# Patient Record
Sex: MALE | Race: ASIAN | NOT HISPANIC OR LATINO | URBAN - METROPOLITAN AREA
[De-identification: names, ages, dates, MRNs, and addresses within clinical notes are randomized per-mention and may not be internally consistent; named-entity substitution may affect disease eponyms.]

---

## 2023-04-22 ENCOUNTER — INPATIENT (INPATIENT)
Facility: HOSPITAL | Age: 78
LOS: 4 days | Discharge: NURSING, REHAB OR LONG-TERM CARE | DRG: 86 | End: 2023-04-27
Attending: INTERNAL MEDICINE | Admitting: INTERNAL MEDICINE
Payer: MEDICARE

## 2023-04-22 VITALS
RESPIRATION RATE: 16 BRPM | DIASTOLIC BLOOD PRESSURE: 73 MMHG | SYSTOLIC BLOOD PRESSURE: 104 MMHG | HEART RATE: 117 BPM | HEIGHT: 64 IN | TEMPERATURE: 97 F | OXYGEN SATURATION: 98 % | WEIGHT: 139.99 LBS

## 2023-04-22 DIAGNOSIS — I62.00 NONTRAUMATIC SUBDURAL HEMORRHAGE, UNSPECIFIED: ICD-10-CM

## 2023-04-22 LAB
ALBUMIN SERPL ELPH-MCNC: 4 G/DL — SIGNIFICANT CHANGE UP (ref 3.3–5)
ALP SERPL-CCNC: 56 U/L — SIGNIFICANT CHANGE UP (ref 40–120)
ALT FLD-CCNC: 19 U/L — SIGNIFICANT CHANGE UP (ref 10–45)
ANION GAP SERPL CALC-SCNC: 12 MMOL/L — SIGNIFICANT CHANGE UP (ref 5–17)
APTT BLD: 28.5 SEC — SIGNIFICANT CHANGE UP (ref 27.5–35.5)
AST SERPL-CCNC: 36 U/L — SIGNIFICANT CHANGE UP (ref 10–40)
BASOPHILS # BLD AUTO: 0.01 K/UL — SIGNIFICANT CHANGE UP (ref 0–0.2)
BASOPHILS NFR BLD AUTO: 0.2 % — SIGNIFICANT CHANGE UP (ref 0–2)
BILIRUB SERPL-MCNC: 0.7 MG/DL — SIGNIFICANT CHANGE UP (ref 0.2–1.2)
BUN SERPL-MCNC: 17 MG/DL — SIGNIFICANT CHANGE UP (ref 7–23)
CALCIUM SERPL-MCNC: 9.7 MG/DL — SIGNIFICANT CHANGE UP (ref 8.4–10.5)
CHLORIDE SERPL-SCNC: 98 MMOL/L — SIGNIFICANT CHANGE UP (ref 96–108)
CO2 SERPL-SCNC: 26 MMOL/L — SIGNIFICANT CHANGE UP (ref 22–31)
CREAT SERPL-MCNC: 0.93 MG/DL — SIGNIFICANT CHANGE UP (ref 0.5–1.3)
EGFR: 84 ML/MIN/1.73M2 — SIGNIFICANT CHANGE UP
EOSINOPHIL # BLD AUTO: 0.08 K/UL — SIGNIFICANT CHANGE UP (ref 0–0.5)
EOSINOPHIL NFR BLD AUTO: 1.4 % — SIGNIFICANT CHANGE UP (ref 0–6)
GLUCOSE SERPL-MCNC: 161 MG/DL — HIGH (ref 70–99)
HCT VFR BLD CALC: 42.3 % — SIGNIFICANT CHANGE UP (ref 39–50)
HGB BLD-MCNC: 14.1 G/DL — SIGNIFICANT CHANGE UP (ref 13–17)
IMM GRANULOCYTES NFR BLD AUTO: 0.7 % — SIGNIFICANT CHANGE UP (ref 0–0.9)
INR BLD: 1.04 RATIO — SIGNIFICANT CHANGE UP (ref 0.88–1.16)
LYMPHOCYTES # BLD AUTO: 1.07 K/UL — SIGNIFICANT CHANGE UP (ref 1–3.3)
LYMPHOCYTES # BLD AUTO: 18.3 % — SIGNIFICANT CHANGE UP (ref 13–44)
MAGNESIUM SERPL-MCNC: 2 MG/DL — SIGNIFICANT CHANGE UP (ref 1.6–2.6)
MCHC RBC-ENTMCNC: 29.9 PG — SIGNIFICANT CHANGE UP (ref 27–34)
MCHC RBC-ENTMCNC: 33.3 GM/DL — SIGNIFICANT CHANGE UP (ref 32–36)
MCV RBC AUTO: 89.6 FL — SIGNIFICANT CHANGE UP (ref 80–100)
MONOCYTES # BLD AUTO: 0.6 K/UL — SIGNIFICANT CHANGE UP (ref 0–0.9)
MONOCYTES NFR BLD AUTO: 10.3 % — SIGNIFICANT CHANGE UP (ref 2–14)
NEUTROPHILS # BLD AUTO: 4.04 K/UL — SIGNIFICANT CHANGE UP (ref 1.8–7.4)
NEUTROPHILS NFR BLD AUTO: 69.1 % — SIGNIFICANT CHANGE UP (ref 43–77)
NRBC # BLD: 0 /100 WBCS — SIGNIFICANT CHANGE UP (ref 0–0)
PHOSPHATE SERPL-MCNC: 3.2 MG/DL — SIGNIFICANT CHANGE UP (ref 2.5–4.5)
PLATELET # BLD AUTO: 218 K/UL — SIGNIFICANT CHANGE UP (ref 150–400)
POTASSIUM SERPL-MCNC: 4.5 MMOL/L — SIGNIFICANT CHANGE UP (ref 3.5–5.3)
POTASSIUM SERPL-SCNC: 4.5 MMOL/L — SIGNIFICANT CHANGE UP (ref 3.5–5.3)
PROT SERPL-MCNC: 7.3 G/DL — SIGNIFICANT CHANGE UP (ref 6–8.3)
PROTHROM AB SERPL-ACNC: 12 SEC — SIGNIFICANT CHANGE UP (ref 10.5–13.4)
RBC # BLD: 4.72 M/UL — SIGNIFICANT CHANGE UP (ref 4.2–5.8)
RBC # FLD: 12.7 % — SIGNIFICANT CHANGE UP (ref 10.3–14.5)
SODIUM SERPL-SCNC: 136 MMOL/L — SIGNIFICANT CHANGE UP (ref 135–145)
WBC # BLD: 5.84 K/UL — SIGNIFICANT CHANGE UP (ref 3.8–10.5)
WBC # FLD AUTO: 5.84 K/UL — SIGNIFICANT CHANGE UP (ref 3.8–10.5)

## 2023-04-22 PROCEDURE — 99291 CRITICAL CARE FIRST HOUR: CPT

## 2023-04-22 PROCEDURE — 74177 CT ABD & PELVIS W/CONTRAST: CPT | Mod: 26,MD

## 2023-04-22 PROCEDURE — 70450 CT HEAD/BRAIN W/O DYE: CPT | Mod: 26

## 2023-04-22 PROCEDURE — 70450 CT HEAD/BRAIN W/O DYE: CPT | Mod: 26,MD,77

## 2023-04-22 PROCEDURE — 71260 CT THORAX DX C+: CPT | Mod: 26,MD

## 2023-04-22 RX ORDER — ACETAMINOPHEN 500 MG
1000 TABLET ORAL ONCE
Refills: 0 | Status: COMPLETED | OUTPATIENT
Start: 2023-04-22 | End: 2023-04-23

## 2023-04-22 RX ORDER — LEVETIRACETAM 250 MG/1
1000 TABLET, FILM COATED ORAL ONCE
Refills: 0 | Status: DISCONTINUED | OUTPATIENT
Start: 2023-04-22 | End: 2023-04-22

## 2023-04-22 RX ORDER — LEVETIRACETAM 250 MG/1
1000 TABLET, FILM COATED ORAL ONCE
Refills: 0 | Status: COMPLETED | OUTPATIENT
Start: 2023-04-22 | End: 2023-04-22

## 2023-04-22 RX ORDER — ACETAMINOPHEN 500 MG
975 TABLET ORAL ONCE
Refills: 0 | Status: DISCONTINUED | OUTPATIENT
Start: 2023-04-22 | End: 2023-04-22

## 2023-04-22 RX ORDER — SIMVASTATIN 20 MG/1
1 TABLET, FILM COATED ORAL
Refills: 0 | DISCHARGE

## 2023-04-22 RX ORDER — TRAZODONE HCL 50 MG
1 TABLET ORAL
Refills: 0 | DISCHARGE

## 2023-04-22 RX ORDER — TAMSULOSIN HYDROCHLORIDE 0.4 MG/1
1 CAPSULE ORAL
Refills: 0 | DISCHARGE

## 2023-04-22 RX ORDER — METFORMIN HYDROCHLORIDE 850 MG/1
1 TABLET ORAL
Refills: 0 | DISCHARGE

## 2023-04-22 RX ORDER — ASPIRIN/CALCIUM CARB/MAGNESIUM 324 MG
1 TABLET ORAL
Refills: 0 | DISCHARGE

## 2023-04-22 RX ADMIN — LEVETIRACETAM 400 MILLIGRAM(S): 250 TABLET, FILM COATED ORAL at 16:45

## 2023-04-22 NOTE — ED ADULT NURSE NOTE - NSIMPLEMENTINTERV_GEN_ALL_ED
Implemented All Fall Risk Interventions:  [place] to call system. Call bell, personal items and telephone within reach. Instruct patient to call for assistance. Room bathroom lighting operational. Non-slip footwear when patient is off stretcher. Physically safe environment: no spills, clutter or unnecessary equipment. Stretcher in lowest position, wheels locked, appropriate side rails in place. Provide visual cue, wrist band, yellow gown, etc. Monitor gait and stability. Monitor for mental status changes and reorient to person, place, and time. Review medications for side effects contributing to fall risk. Reinforce activity limits and safety measures with patient and family.

## 2023-04-22 NOTE — ED ADULT NURSE REASSESSMENT NOTE - NS ED NURSE REASSESS COMMENT FT1
Son at bedside to assist in translation. Patient A&Ox2, disoriented to time. Patient moves all extremities with equal strength. PERRLA 3mm b/l.

## 2023-04-22 NOTE — ED ADULT NURSE NOTE - OBJECTIVE STATEMENT
77 year old male patient presents to ED with multiple medical complaints. Patient primarily Polish speaking with son Elkin at bedside to assist in translation. Per son, patient has been having difficulty ambulating x 1 year, worsening recently. Son states that patient walks off balance and leans back when walking and has had multiple falls. Patient last fell this morning and two days ago and hit R side of head- yellowish bruising noted. Denies LOC at time Patient usually able to ambulate with a cane but now needs to use a walker. Patient denies pain or discomfort. Denies HA, dizziness, CP, palpitations, abd pain, n/v/d, fever, chills, urinary symptoms. Patient has been following with a neurologist and had MRI of brain in February and MRI of spine this week with no pertinent findings. Son reports decreased PO intake and unintentional weight loss. Patient and family aware of plan of care

## 2023-04-22 NOTE — CONSULT NOTE ADULT - ASSESSMENT
Jillian Aviles  77M Hx HTN/HLD/CAD w/stent 2yr prior on ASA p/f generalized weakness and multiple recent falls, most recent this morning. Last fall w/headstrike 2d ago. Pt has lost 10lb last two weeks, recent outpatient MR neuroaxis w/C3/4, C4/5 disks with moderate canal stenosis, complains of neck pain w/radic. CTH w/small R subacute SDH w/o MLS or sig mass effect. Plt wnl  Exam: Romansh speaking, AOx2, PERRL, EOMI, no facial, no drift, WORKMAN 5/5, not myelopathic  -Coags  -keppra 500BID for 7d  -4h repeat CTh, q1h neurochecks until repeat, if stable ok for q4h  -Additional long interval CTH tomorrow AM given ASA  -no acute neurosurgery intervention, should follow up in clinic with Dr. Puga in 2 weeks to f/u the SDH and discuss his cervical stenosis  -Hold AC/AP, can start SQL for DVT chemoppx if otherwise indicated 24h after tomorrow AM's CTH if read as stable Jillian Aviles  77M Hx HTN/HLD/CAD w/stent 2yr prior on ASA p/f generalized weakness and multiple recent falls, most recent this morning. Last fall w/headstrike 2d ago. Pt has lost 10lb last two weeks, recent outpatient MR neuroaxis w/C3/4, C4/5 disks with moderate canal stenosis, complains of neck pain w/radic. CTH w/small L subacute SDH w/o MLS or sig mass effect. Plt wnl  Exam: Setswana speaking, AOx2, PERRL, EOMI, no facial, no drift, WORKMAN 5/5, not myelopathic  -Coags  -keppra 500BID for 7d  -4h repeat CTh, q1h neurochecks until repeat, if stable ok for q4h  -Additional long interval CTH tomorrow AM given ASA  -no acute neurosurgery intervention, should follow up in clinic with Dr. Puga in 2 weeks to f/u the SDH and discuss his cervical stenosis  -Hold AC/AP, can start SQL for DVT chemoppx if otherwise indicated 24h after tomorrow AM's CTH if read as stable

## 2023-04-22 NOTE — ED PROVIDER NOTE - PHYSICAL EXAMINATION
Gen: cachectic   HEENT: EOMI, no nasal discharge, mucous membranes dry, no scalp hematoma. + mild R periorbital ecchymosis.   CV: fast rate, regular rhythm, 2+ radial pulses b/l  Resp: no accessory muscle use, no increased work of breathing  GI: Abdomen soft non-distended, NTTP  MSK: No open wounds, no LE edema, no midline spinal ttp. no clavicular stepoffs or deformities.   Neuro: A&Ox4, following commands, moving all four extremities spontaneously. CN3-12 intact, EOMI. PERRLA, 5/5 strength in b/l UE, 4 b/l LE.  Sensation intact bl in UE/LE.  Psych: appropriate mood

## 2023-04-22 NOTE — ED ADULT NURSE REASSESSMENT NOTE - NS ED NURSE REASSESS COMMENT FT1
Patient returned from CT with son at bedside. Patient remains A&Ox2, disoriented to year. PERRRL b/l, 3mm. Equal strength noted x 4 extremities. Son aware of plan of care to await CT results.

## 2023-04-22 NOTE — CONSULT NOTE ADULT - SUBJECTIVE AND OBJECTIVE BOX
p (1480)     HPI:  77M Hx HTN/HLD/CAD w/stent 2yr prior on ASA p/f generalized weakness and multiple recent falls, most recent this morning. Last fall w/headstrike 2d ago. Pt has lost 10lb last two weeks, recent outpatient MR neuroaxis w/C3/4, C4/5 disks with moderate canal stenosis, complains of neck pain w/radic. CTH w/small R subacute SDH w/o MLS or sig mass effect. Plt wnl  Exam: Georgian speaking, AOx2, PERRL, EOMI, no facial, no drift, WORKMAN 5/5, not myelopathic        --Anticoagulation: ASA81    =====================  PAST MEDICAL HISTORY     PAST SURGICAL HISTORY     No Known Allergies      MEDICATIONS:  Antibiotics:    Neuro:  acetaminophen     Tablet .. 975 milliGRAM(s) Oral once    Other:      SOCIAL HISTORY:   Occupation:   Marital Status:     FAMILY HISTORY:      ROS: Negative except per HPI    LABS:                          14.1   5.84  )-----------( 218      ( 22 Apr 2023 11:22 )             42.3     04-22    136  |  98  |  17  ----------------------------<  161<H>  4.5   |  26  |  0.93    Ca    9.7      22 Apr 2023 11:22  Phos  3.2     04-22  Mg     2.0     04-22    TPro  7.3  /  Alb  4.0  /  TBili  0.7  /  DBili  x   /  AST  36  /  ALT  19  /  AlkPhos  56  04-22       p (1480)     HPI:  77M Hx HTN/HLD/CAD w/stent 2yr prior on ASA p/f generalized weakness and multiple recent falls, most recent this morning. Last fall w/headstrike 2d ago. Pt has lost 10lb last two weeks, recent outpatient MR neuroaxis w/C3/4, C4/5 disks with moderate canal stenosis, complains of neck pain w/radic. CTH w/small L subacute SDH w/o MLS or sig mass effect. Plt wnl  Exam: Maltese speaking, AOx2, PERRL, EOMI, no facial, no drift, WORKMAN 5/5, not myelopathic        --Anticoagulation: ASA81    =====================  PAST MEDICAL HISTORY     PAST SURGICAL HISTORY     No Known Allergies      MEDICATIONS:  Antibiotics:    Neuro:  acetaminophen     Tablet .. 975 milliGRAM(s) Oral once    Other:      SOCIAL HISTORY:   Occupation:   Marital Status:     FAMILY HISTORY:      ROS: Negative except per HPI    LABS:                          14.1   5.84  )-----------( 218      ( 22 Apr 2023 11:22 )             42.3     04-22    136  |  98  |  17  ----------------------------<  161<H>  4.5   |  26  |  0.93    Ca    9.7      22 Apr 2023 11:22  Phos  3.2     04-22  Mg     2.0     04-22    TPro  7.3  /  Alb  4.0  /  TBili  0.7  /  DBili  x   /  AST  36  /  ALT  19  /  AlkPhos  56  04-22

## 2023-04-22 NOTE — PATIENT PROFILE ADULT - FUNCTIONAL ASSESSMENT - BASIC MOBILITY 6.
2-calculated by average/Not able to assess (calculate score using [facility] averaging method)

## 2023-04-22 NOTE — ED PROVIDER NOTE - OBJECTIVE STATEMENT
78YO M hx HTN, HLD, p/w leg weakness, weight loss. Onset months prior, associated with multiple falls, most recent this morning, fell onto the side.  Also endorses fall with head strike 2 days prior, patient at baseline mental status.  Patient has had significant work-up for this, including MRI brain, MRI C/T/L-spine, significant for moderate neuroforaminal stenosis of the C-spine, no major issues to the L-spine.  Patient also had outpatient echo, stress test, all normal.  Per son, has had 10 pounds of weight loss over the past 2 weeks, appears very thin, decreased p.o. intake.  Denies any recent blurry vision, numbness/paresthesias to arms/legs, headache, neck pain/stiffness.  denies recent fevers, cough, shortness of breath, abdominal pain, back pain.  Patient not on blood thinner.

## 2023-04-22 NOTE — ED ADULT NURSE REASSESSMENT NOTE - NSFALLRSKASSESSDT_ED_ALL_ED
22-Apr-2023 17:57
22-Apr-2023 19:01
22-Apr-2023 11:33
22-Apr-2023 14:16
22-Apr-2023 18:27
22-Apr-2023 16:30
22-Apr-2023 16:50

## 2023-04-22 NOTE — ED ADULT TRIAGE NOTE - CHIEF COMPLAINT QUOTE
not able to ambulate; last few months getting worse; now falling all the time; mri showed nothing; speaking slowly; not sleeping; not eating

## 2023-04-22 NOTE — H&P ADULT - NSHPPHYSICALEXAM_GEN_ALL_CORE
Vital Signs Last 24 Hrs  T(C): 36.7 (04-22-23 @ 21:00), Max: 37 (04-22-23 @ 16:40)  T(F): 98.1 (04-22-23 @ 21:00), Max: 98.6 (04-22-23 @ 16:40)  HR: 102 (04-22-23 @ 21:00) (102 - 117)  BP: 144/78 (04-22-23 @ 21:00) (104/73 - 144/78)  BP(mean): 93 (04-22-23 @ 15:44) (93 - 106)  RR: 17 (04-22-23 @ 21:00) (16 - 18)  SpO2: 98% (04-22-23 @ 21:00) (98% - 100%)
No change

## 2023-04-22 NOTE — ED ADULT NURSE REASSESSMENT NOTE - NS ED NURSE REASSESS COMMENT FT1
Patient failed dysphagia screening. MD Zaragoza made aware- to change keppra order to IV. Q1hour neuro checks initiated, patient disoriented to time, moving all extremities with equal strength. Family at bedside aware of NPO status and plan for repeat CT at 5430pm. Syriac  # [id number], Raymond used for translation. Patient failed dysphagia screening. MD Zaragoza made aware- to change keppra order to IV. Q1hour neuro checks initiated, patient disoriented to time, moving all extremities with equal strength. Family at bedside aware of NPO status and plan for repeat CT at 5430pm.

## 2023-04-22 NOTE — H&P ADULT - NSHPLABSRESULTS_GEN_ALL_CORE
14.1   5.84  )-----------( 218      ( 22 Apr 2023 11:22 )             42.3   04-22    136  |  98  |  17  ----------------------------<  161<H>  4.5   |  26  |  0.93    Ca    9.7      22 Apr 2023 11:22  Phos  3.2     04-22  Mg     2.0     04-22    TPro  7.3  /  Alb  4.0  /  TBili  0.7  /  DBili  x   /  AST  36  /  ALT  19  /  AlkPhos  56  04-22

## 2023-04-22 NOTE — ED PROVIDER NOTE - CLINICAL SUMMARY MEDICAL DECISION MAKING FREE TEXT BOX
Sarah Zaragoza MD, PGY-3: 76YO M hx HTN, HLD, p/w leg weakness, weight loss. vs: tachycardic. consider new malignancy, lower suspicion ICH although possible given recent trauma.   Also consider electrolyte issue. plan for basic labs, CT head. Sarah Zaragoza MD, PGY-3: 78YO M hx HTN, HLD, p/w leg weakness, weight loss. vs: tachycardic. consider new malignancy, lower suspicion ICH although possible given recent trauma.   Also consider electrolyte issue. plan for basic labs, CT head.    Adam: 77 year old male with pmhx of htn, hld, here with leg weakness, weight loss. patient reports weight loss over months.  had mri outpatient of c/t/l-spine that shows moderate neuroforaminal stenosis.  had outpatient echo/stress all normal. no focal deficits on exams. will get labs, ct head, reassess

## 2023-04-22 NOTE — ED PROVIDER NOTE - PROGRESS NOTE DETAILS
Saarh Zaragoza MD, PGY-3: neurosurgery aware. will gabby pt. clinically stable, well appearing. Sarah Zaragoza PGY3 signout given to krystyna Gan for PT assessment, frequent falls, repeat head ct.

## 2023-04-22 NOTE — H&P ADULT - ASSESSMENT
A/P     #Subdural hematoma s/p fall   # syncope   small SDH on CT scan   seen by neurosurgery : no ac intervention   -pt. has recurrent fall   -consult neurology Dr. Jones     Weight loss / cachexia  -as per son lost 10 LBS in last 2 weeks   poor appetite   CT ches/abd/pelvis : done : wnl in ED     Atelectasis :  -on CT chest   ? PNA : but no wbc , no fever   will hold off on abx     BPH   c/w flomax     HTN :   c/w losartan , hold other meds   if BP elevated , will restart all     HLD :  -hold statin or home meds     dispo: will need PT eval and likely STR

## 2023-04-22 NOTE — ED ADULT NURSE REASSESSMENT NOTE - NS ED NURSE REASSESS COMMENT FT1
Attempted to call report to 3 ana with no answer. Oncoming [place] and charge RN  made aware report not called

## 2023-04-22 NOTE — PATIENT PROFILE ADULT - FALL HARM RISK - HARM RISK INTERVENTIONS

## 2023-04-23 LAB
GLUCOSE BLDC GLUCOMTR-MCNC: 108 MG/DL — HIGH (ref 70–99)
GLUCOSE BLDC GLUCOMTR-MCNC: 116 MG/DL — HIGH (ref 70–99)
GLUCOSE BLDC GLUCOMTR-MCNC: 186 MG/DL — HIGH (ref 70–99)
GLUCOSE BLDC GLUCOMTR-MCNC: 95 MG/DL — SIGNIFICANT CHANGE UP (ref 70–99)
GLUCOSE BLDC GLUCOMTR-MCNC: 97 MG/DL — SIGNIFICANT CHANGE UP (ref 70–99)

## 2023-04-23 PROCEDURE — 70450 CT HEAD/BRAIN W/O DYE: CPT | Mod: 26

## 2023-04-23 RX ORDER — SODIUM CHLORIDE 9 MG/ML
1000 INJECTION, SOLUTION INTRAVENOUS
Refills: 0 | Status: DISCONTINUED | OUTPATIENT
Start: 2023-04-23 | End: 2023-04-27

## 2023-04-23 RX ORDER — LEVETIRACETAM 250 MG/1
500 TABLET, FILM COATED ORAL
Refills: 0 | Status: DISCONTINUED | OUTPATIENT
Start: 2023-04-23 | End: 2023-04-27

## 2023-04-23 RX ORDER — DEXTROSE 50 % IN WATER 50 %
25 SYRINGE (ML) INTRAVENOUS ONCE
Refills: 0 | Status: DISCONTINUED | OUTPATIENT
Start: 2023-04-23 | End: 2023-04-27

## 2023-04-23 RX ORDER — TRAZODONE HCL 50 MG
100 TABLET ORAL ONCE
Refills: 0 | Status: COMPLETED | OUTPATIENT
Start: 2023-04-23 | End: 2023-04-23

## 2023-04-23 RX ORDER — INSULIN LISPRO 100/ML
3 VIAL (ML) SUBCUTANEOUS
Refills: 0 | Status: DISCONTINUED | OUTPATIENT
Start: 2023-04-23 | End: 2023-04-27

## 2023-04-23 RX ORDER — DEXTROSE 50 % IN WATER 50 %
12.5 SYRINGE (ML) INTRAVENOUS ONCE
Refills: 0 | Status: DISCONTINUED | OUTPATIENT
Start: 2023-04-23 | End: 2023-04-27

## 2023-04-23 RX ORDER — DEXTROSE 50 % IN WATER 50 %
15 SYRINGE (ML) INTRAVENOUS ONCE
Refills: 0 | Status: DISCONTINUED | OUTPATIENT
Start: 2023-04-23 | End: 2023-04-27

## 2023-04-23 RX ORDER — PANTOPRAZOLE SODIUM 20 MG/1
40 TABLET, DELAYED RELEASE ORAL
Refills: 0 | Status: DISCONTINUED | OUTPATIENT
Start: 2023-04-23 | End: 2023-04-27

## 2023-04-23 RX ORDER — INSULIN LISPRO 100/ML
VIAL (ML) SUBCUTANEOUS
Refills: 0 | Status: DISCONTINUED | OUTPATIENT
Start: 2023-04-23 | End: 2023-04-25

## 2023-04-23 RX ORDER — GLUCAGON INJECTION, SOLUTION 0.5 MG/.1ML
1 INJECTION, SOLUTION SUBCUTANEOUS ONCE
Refills: 0 | Status: DISCONTINUED | OUTPATIENT
Start: 2023-04-23 | End: 2023-04-27

## 2023-04-23 RX ADMIN — PANTOPRAZOLE SODIUM 40 MILLIGRAM(S): 20 TABLET, DELAYED RELEASE ORAL at 06:13

## 2023-04-23 RX ADMIN — Medication 400 MILLIGRAM(S): at 00:37

## 2023-04-23 RX ADMIN — Medication 1000 MILLIGRAM(S): at 01:30

## 2023-04-23 RX ADMIN — Medication 3 UNIT(S): at 16:58

## 2023-04-23 RX ADMIN — Medication 100 MILLIGRAM(S): at 00:53

## 2023-04-23 RX ADMIN — Medication 3 UNIT(S): at 13:11

## 2023-04-23 RX ADMIN — LEVETIRACETAM 500 MILLIGRAM(S): 250 TABLET, FILM COATED ORAL at 17:01

## 2023-04-23 RX ADMIN — Medication 1: at 13:10

## 2023-04-23 RX ADMIN — LEVETIRACETAM 500 MILLIGRAM(S): 250 TABLET, FILM COATED ORAL at 06:13

## 2023-04-23 NOTE — PROGRESS NOTE ADULT - ASSESSMENT
A/P     #Subdural hematoma s/p fall   # syncope   small SDH on CT scan   seen by neurosurgery : no ac intervention   -pt. has recurrent fall   -consult neurology Dr. Jones : done     Weight loss / cachexia  -as per son lost 10 LBS in last 2 weeks   poor appetite   CT ches/abd/pelvis : done : wnl in ED     Atelectasis :  -on CT chest   ? PNA : but no wbc , no fever   will hold off on abx     BPH   c/w flomax     HTN :   c/w losartan , hold other meds   if BP elevated , will restart all     HLD :  -hold statin or home meds     dispo: start heparin sq from am for DVT prophylaxsis   PT eval

## 2023-04-23 NOTE — CHART NOTE - NSCHARTNOTEFT_GEN_A_CORE
CT head today stable.    -no acute neurosurgery intervention, patient should follow up in clinic with Dr. Puga in 2 weeks to f/u the SDH and discuss his cervical stenosis  -Hold AC/AP, can start SQL for DVT chemoppx if otherwise indicated in 24hrs

## 2023-04-23 NOTE — CONSULT NOTE ADULT - SUBJECTIVE AND OBJECTIVE BOX
[facility]        Patient is a 77y old  Male who presents with a chief complaint of generalized weakness , multiple falls (2023 11:00)    Excerpt from H&P,"     78YO M hx HTN, HLD, p/w leg weakness, weight loss. Onset months prior, associated with multiple falls, most recent this morning, fell onto the side.  Also endorses fall with head strike 2 days prior, patient at baseline mental status.  Patient has had significant work-up for this, including MRI brain, MRI C/T/L-spine, significant for moderate neuroforaminal stenosis of the C-spine, no major issues to the L-spine.  Patient also had outpatient echo, stress test, all normal.  Per son, has had 10 pounds of weight loss over the past 2 weeks, appears very thin, decreased p.o. intake.  Denies any recent blurry vision, numbness/paresthesias to arms/legs, headache, neck pain/stiffness.  denies recent fevers, cough, shortness of breath, abdominal pain, back pain.  Patient not on blood thinner. (2023 14:57)           *****PAST MEDICAL / Surgical  HISTORY:  PAST MEDICAL & SURGICAL HISTORY:  No pertinent past medical history      No significant past surgical history               *****FAMILY HISTORY:  FAMILY HISTORY:           *****SOCIAL HISTORY:  Alcohol: None  Smoking: None         *****ALLERGIES:   Allergies    No Known Allergies    Intolerances             *****MEDICATIONS: current medication reviewed and documented.   MEDICATIONS  (STANDING):  dextrose 5%. 1000 milliLiter(s) (100 mL/Hr) IV Continuous <Continuous>  dextrose 5%. 1000 milliLiter(s) (50 mL/Hr) IV Continuous <Continuous>  dextrose 50% Injectable 12.5 Gram(s) IV Push once  dextrose 50% Injectable 25 Gram(s) IV Push once  dextrose 50% Injectable 25 Gram(s) IV Push once  glucagon  Injectable 1 milliGRAM(s) IntraMuscular once  insulin lispro (ADMELOG) corrective regimen sliding scale   SubCutaneous three times a day before meals  insulin lispro Injectable (ADMELOG) 3 Unit(s) SubCutaneous before breakfast  insulin lispro Injectable (ADMELOG) 3 Unit(s) SubCutaneous before dinner  insulin lispro Injectable (ADMELOG) 3 Unit(s) SubCutaneous before lunch  levETIRAcetam 500 milliGRAM(s) Oral two times a day  pantoprazole    Tablet 40 milliGRAM(s) Oral before breakfast    MEDICATIONS  (PRN):  dextrose Oral Gel 15 Gram(s) Oral once PRN Blood Glucose LESS THAN 70 milliGRAM(s)/deciliter           *****REVIEW OF SYSTEM:  GEN: no fever, no chills, no pain  RESP: no SOB, no cough, no sputum  CVS: no chest pain, no palpitations, no edema  GI: no abdominal pain, no nausea, no vomiting, no constipation, no diarrhea  : no dysurea, no frequency, no hematurea  Neuro: no headache, no dizziness  PSYCH: no anxiety, no depression  Derm : no itching, no rash         *****VITAL SIGNS:  T(C): 36.5 (23 @ 19:45), Max: 36.7 (23 @ 21:00)  HR: 98 (23 @ 19:45) (95 - 102)  BP: 138/84 (23 @ 19:45) (109/74 - 144/78)  RR: 18 (23 @ 19:45) (17 - 18)  SpO2: 99% (23 @ 19:45) (97% - 99%)  Wt(kg): --     @ 07:01  -   @ 07:00  --------------------------------------------------------  IN: 50 mL / OUT: 0 mL / NET: 50 mL             *****PHYSICAL EXAM:   Alert oriented x 3   Attention comprehension are fair. Able to name, repeat, read without any difficulty.   Able to follow 3 step commands.     EOMI fundi not visualized,  VFF to confrontration  No facial asymmetry   Tongue is midline   Palate elevates symmetrically   Moving all 4 ext symmetrically no pronator drift   Reflexes are symmetric throughout   sensation is grossly symmetric  Gait : not assessed.  B/L down going toes               *****LAB AND IMAGIN.1   5.84  )-----------( 218      ( 2023 11:22 )             42.3                   136  |  98  |  17  ----------------------------<  161<H>  4.5   |  26  |  0.93    Ca    9.7      2023 11:22  Phos  3.2       Mg     2.0         TPro  7.3  /  Alb  4.0  /  TBili  0.7  /  DBili  x   /  AST  36  /  ALT  19  /  AlkPhos  56      PT/INR - ( 2023 16:32 )   PT: 12.0 sec;   INR: 1.04 ratio         PTT - ( 2023 16:32 )  PTT:28.5 sec                            [All pertinent recent Imaging reports reviewed]         *****A S S E S S M E N T   A N D   P L A N :        Problem/Recommendations 1:        Problem/Recommendations 2:         pt at risk for developing delirium, therefore please institute the following preventative measures if possible          - initiating early mobilization          -minimizing "tethers" - IV, oxygen, catheters, etc          -avoiding   sedatives          -maintaining hydration/nutrition          -avoid anticholinergics - diphenhydramine, etc          -pain control          -sleep wake cycle regulation; avoid day time somnolence           -supportive environment    ___________________________  Will follow with you.  Thank you,  Yoselin Jones MD  Diplomate of the American Board of Neurology and Psychiatry.  Diplomate of the American Board of Vascular Neurology.   [facility] ([facility]), [facility]   Ph: [phone]    Differential diagnosis and plan of care discussed with patient after the evaluation.   Advanced care planning options discussed.   Pain assessed and judicious use of narcotics when appropriate was discussed.  Importance of Fall prevention discussed.  Counseling on Smoking and Alcohol cessation was offered when appropriate.  Counseling on Diet, exercise, and medication compliance was done.   83 minutes spent on the total encounter;  more than 50 % of the visit was spent on counseling  and or coordinating care by the attending physician.    Thank you for allowing me to participate in the care of this [facility] patient. Please do not hesitate to call me if you have any questions.     This and subsequent notes  will  inherently be subject to errors including those of syntax and substitutions which may escape proofreading. In such instances original meaning may be extrapolated by contextual derivation.    [facility]        Patient is a 77y old  Male who presents with a chief complaint of generalized weakness , multiple falls (2023 11:00)    Excerpt from H&P,"     78YO M hx HTN, HLD, p/w leg weakness, weight loss. Onset months prior, associated with multiple falls, most recent this morning, fell onto the side.  Also endorses fall with head strike 2 days prior, patient at baseline mental status.  Patient has had significant work-up for this, including MRI brain, MRI C/T/L-spine, significant for moderate neuroforaminal stenosis of the C-spine, no major issues to the L-spine.  Patient also had outpatient echo, stress test, all normal.  Per son, has had 10 pounds of weight loss over the past 2 weeks, appears very thin, decreased p.o. intake.  Denies any recent blurry vision, numbness/paresthesias to arms/legs, headache, neck pain/stiffness.  denies recent fevers, cough, shortness of breath, abdominal pain, back pain.  Patient not on blood thinner. (2023 14:57)           *****PAST MEDICAL / Surgical  HISTORY:  PAST MEDICAL & SURGICAL HISTORY:  No pertinent past medical history      No significant past surgical history               *****FAMILY HISTORY:  FAMILY HISTORY:           *****SOCIAL HISTORY:  Alcohol: None  Smoking: None         *****ALLERGIES:   Allergies    No Known Allergies    Intolerances             *****MEDICATIONS: current medication reviewed and documented.   MEDICATIONS  (STANDING):  dextrose 5%. 1000 milliLiter(s) (100 mL/Hr) IV Continuous <Continuous>  dextrose 5%. 1000 milliLiter(s) (50 mL/Hr) IV Continuous <Continuous>  dextrose 50% Injectable 12.5 Gram(s) IV Push once  dextrose 50% Injectable 25 Gram(s) IV Push once  dextrose 50% Injectable 25 Gram(s) IV Push once  glucagon  Injectable 1 milliGRAM(s) IntraMuscular once  insulin lispro (ADMELOG) corrective regimen sliding scale   SubCutaneous three times a day before meals  insulin lispro Injectable (ADMELOG) 3 Unit(s) SubCutaneous before breakfast  insulin lispro Injectable (ADMELOG) 3 Unit(s) SubCutaneous before dinner  insulin lispro Injectable (ADMELOG) 3 Unit(s) SubCutaneous before lunch  levETIRAcetam 500 milliGRAM(s) Oral two times a day  pantoprazole    Tablet 40 milliGRAM(s) Oral before breakfast    MEDICATIONS  (PRN):  dextrose Oral Gel 15 Gram(s) Oral once PRN Blood Glucose LESS THAN 70 milliGRAM(s)/deciliter           *****REVIEW OF SYSTEM:  GEN: no fever, no chills, no pain  RESP: no SOB, no cough, no sputum  CVS: no chest pain, no palpitations, no edema  GI: no abdominal pain, no nausea, no vomiting, no constipation, no diarrhea  : no dysurea, no frequency, no hematurea  Neuro: no headache, no dizziness  PSYCH: no anxiety, no depression  Derm : no itching, no rash         *****VITAL SIGNS:  T(C): 36.5 (23 @ 19:45), Max: 36.7 (23 @ 21:00)  HR: 98 (23 @ 19:45) (95 - 102)  BP: 138/84 (23 @ 19:45) (109/74 - 144/78)  RR: 18 (23 @ 19:45) (17 - 18)  SpO2: 99% (23 @ 19:45) (97% - 99%)  Wt(kg): --     @ 07:01  -   @ 07:00  --------------------------------------------------------  IN: 50 mL / OUT: 0 mL / NET: 50 mL             *****PHYSICAL EXAM:   Alert oriented x 3   Attention comprehension are fair. Able to name, repeat, read without any difficulty.   Able to follow 3 step commands.     EOMI fundi not visualized,  VFF to confrontration  No facial asymmetry   Tongue is midline   Palate elevates symmetrically   Moving all 4 ext symmetrically no pronator drift   Reflexes are symmetric throughout   sensation is grossly symmetric  Gait : not assessed.  B/L down going toes               *****LAB AND IMAGIN.1   5.84  )-----------( 218      ( 2023 11:22 )             42.3                   136  |  98  |  17  ----------------------------<  161<H>  4.5   |  26  |  0.93    Ca    9.7      2023 11:22  Phos  3.2       Mg     2.0         TPro  7.3  /  Alb  4.0  /  TBili  0.7  /  DBili  x   /  AST  36  /  ALT  19  /  AlkPhos  56      PT/INR - ( 2023 16:32 )   PT: 12.0 sec;   INR: 1.04 ratio         PTT - ( 2023 16:32 )  PTT:28.5 sec                        < from: CT Head No Cont (23 @ 12:58) >    IMPRESSION:   Tiny LEFT posterior frontal parietal subdural hematoma   unchanged. Minimal periventricular white matter ischemia is noted. Mild   cerebellar atrophy is noted.    Mild to moderate mucosal thickening in   the BILATERAL maxillary sinuses.    < end of copied text >      [All pertinent recent Imaging reports reviewed]         *****A S S E S S M E N T   A N D   P L A N :  Excerpt from H&P,"     78YO M hx HTN, HLD, p/w leg weakness, weight loss. Onset months prior, associated with multiple falls, most recent this morning, fell onto the side.  Also endorses fall with head strike 2 days prior, patient at baseline mental status.  Patient has had significant work-up for this, including MRI brain, MRI C/T/L-spine, significant for moderate neuroforaminal stenosis of the C-spine, no major issues to the L-spine.  Patient also had outpatient echo, stress test, all normal.  Per son, has had 10 pounds of weight loss over the past 2 weeks, appears very thin, decreased p.o. intake.  Denies any recent blurry vision, numbness/paresthesias to arms/legs, headache, neck pain/stiffness.  denies recent fevers, cough, shortness of breath, abdominal pain, back pain.  Patient not on blood thinner.        Problem/Recommendations 1:falls   likely multifactorial related to deconditioning, microvascular disease related gait instability, hx of cervical degenerative changese   and impulsivity due to underlying cognitive impairment   pt eval likely benefit from rehab short term   b12/folate/tsh               Problem/Recommendations 2:ams   lkely underlyjng cog impairment   at risk for sundownig   sleep wake cycle regulation   redirect  consider change to obs room            pt at risk for developing delirium, therefore please institute the following preventative measures if possible          - initiating early mobilization          -minimizing "tethers" - IV, oxygen, catheters, etc          -avoiding   sedatives          -maintaining hydration/nutrition          -avoid anticholinergics - diphenhydramine, etc          -pain control          -sleep wake cycle regulation; avoid day time somnolence           -supportive environment    ___________________________  Will follow with you.  Thank you,  Yoselin Jones MD  Diplomate of the American Board of Neurology and Psychiatry.  Diplomate of the American Board of Vascular Neurology.   [facility] ([facility]   Ph: [phone]    Differential diagnosis and plan of care discussed with patient after the evaluation.   Advanced care planning options discussed.   Pain assessed and judicious use of narcotics when appropriate was discussed.  Importance of Fall prevention discussed.  Counseling on Smoking and Alcohol cessation was offered when appropriate.  Counseling on Diet, exercise, and medication compliance was done.   83 minutes spent on the total encounter;  more than 50 % of the visit was spent on counseling  and or coordinating care by the attending physician.    Thank you for allowing me to participate in the care of this [facility] patient. Please do not hesitate to call me if you have any questions.     This and subsequent notes  will  inherently be subject to errors including those of syntax and substitutions which may escape proofreading. In such instances original meaning may be extrapolated by contextual derivation.    [facility]        Patient is a 77y old  Male who presents with a chief complaint of generalized weakness , multiple falls (2023 11:00)    Excerpt from H&P,"     76YO M hx HTN, HLD, p/w leg weakness, weight loss. Onset months prior, associated with multiple falls, most recent this morning, fell onto the side.  Also endorses fall with head strike 2 days prior, patient at baseline mental status.  Patient has had significant work-up for this, including MRI brain, MRI C/T/L-spine, significant for moderate neuroforaminal stenosis of the C-spine, no major issues to the L-spine.  Patient also had outpatient echo, stress test, all normal.  Per son, has had 10 pounds of weight loss over the past 2 weeks, appears very thin, decreased p.o. intake.  Denies any recent blurry vision, numbness/paresthesias to arms/legs, headache, neck pain/stiffness.  denies recent fevers, cough, shortness of breath, abdominal pain, back pain.  Patient not on blood thinner. (2023 14:57)           *****PAST MEDICAL / Surgical  HISTORY:  PAST MEDICAL & SURGICAL HISTORY:  No pertinent past medical history      No significant past surgical history               *****FAMILY HISTORY:  FAMILY HISTORY:           *****SOCIAL HISTORY:  Alcohol: None  Smoking: None         *****ALLERGIES:   Allergies    No Known Allergies    Intolerances             *****MEDICATIONS: current medication reviewed and documented.   MEDICATIONS  (STANDING):  dextrose 5%. 1000 milliLiter(s) (100 mL/Hr) IV Continuous <Continuous>  dextrose 5%. 1000 milliLiter(s) (50 mL/Hr) IV Continuous <Continuous>  dextrose 50% Injectable 12.5 Gram(s) IV Push once  dextrose 50% Injectable 25 Gram(s) IV Push once  dextrose 50% Injectable 25 Gram(s) IV Push once  glucagon  Injectable 1 milliGRAM(s) IntraMuscular once  insulin lispro (ADMELOG) corrective regimen sliding scale   SubCutaneous three times a day before meals  insulin lispro Injectable (ADMELOG) 3 Unit(s) SubCutaneous before breakfast  insulin lispro Injectable (ADMELOG) 3 Unit(s) SubCutaneous before dinner  insulin lispro Injectable (ADMELOG) 3 Unit(s) SubCutaneous before lunch  levETIRAcetam 500 milliGRAM(s) Oral two times a day  pantoprazole    Tablet 40 milliGRAM(s) Oral before breakfast    MEDICATIONS  (PRN):  dextrose Oral Gel 15 Gram(s) Oral once PRN Blood Glucose LESS THAN 70 milliGRAM(s)/deciliter           *****REVIEW OF SYSTEM:    unable to obtain        *****VITAL SIGNS:  T(C): 36.5 (23 @ 19:45), Max: 36.7 (23 @ 21:00)  HR: 98 (23 @ 19:45) (95 - 102)  BP: 138/84 (23 @ 19:45) (109/74 - 144/78)  RR: 18 (23 @ 19:45) (17 - 18)  SpO2: 99% (23 @ 19:45) (97% - 99%)  Wt(kg): --     @ 07:01  -   @ 07:00  --------------------------------------------------------  IN: 50 mL / OUT: 0 mL / NET: 50 mL             *****PHYSICAL EXAM:   Alert oriented x 1  confused eyes open to verbal command   Attention comprehension are poor   follows simple commands  able to respond in english        EOMI fundi not visualized,  blinks to threaat   No facial asymmetry   Tongue is midline   Palate elevates symmetrically   Moving all 4 ext symmetrically no pronator drift   Reflexes are symmetric throughout   sensation is grossly symmetric  Gait : not assessed.  B/L down going toes               *****LAB AND IMAGIN.1   5.84  )-----------( 218      ( 2023 11:22 )             42.3               -    136  |  98  |  17  ----------------------------<  161<H>  4.5   |  26  |  0.93    Ca    9.7      2023 11:22  Phos  3.2       Mg     2.0         TPro  7.3  /  Alb  4.0  /  TBili  0.7  /  DBili  x   /  AST  36  /  ALT  19  /  AlkPhos  56  04-22    PT/INR - ( 2023 16:32 )   PT: 12.0 sec;   INR: 1.04 ratio         PTT - ( 2023 16:32 )  PTT:28.5 sec                        < from: CT Head No Cont (23 @ 12:58) >    IMPRESSION:   Tiny LEFT posterior frontal parietal subdural hematoma   unchanged. Minimal periventricular white matter ischemia is noted. Mild   cerebellar atrophy is noted.    Mild to moderate mucosal thickening in   the BILATERAL maxillary sinuses.    < end of copied text >      [All pertinent recent Imaging reports reviewed]         *****A S S E S S M E N T   A N D   P L A N :  Excerpt from H&P,"     76YO M hx HTN, HLD, p/w leg weakness, weight loss. Onset months prior, associated with multiple falls, most recent this morning, fell onto the side.  Also endorses fall with head strike 2 days prior, patient at baseline mental status.  Patient has had significant work-up for this, including MRI brain, MRI C/T/L-spine, significant for moderate neuroforaminal stenosis of the C-spine, no major issues to the L-spine.  Patient also had outpatient echo, stress test, all normal.  Per son, has had 10 pounds of weight loss over the past 2 weeks, appears very thin, decreased p.o. intake.  Denies any recent blurry vision, numbness/paresthesias to arms/legs, headache, neck pain/stiffness.  denies recent fevers, cough, shortness of breath, abdominal pain, back pain.  Patient not on blood thinner.        Problem/Recommendations 1:falls   likely multifactorial related to deconditioning, microvascular disease related gait instability, hx of cervical degenerative changese   and impulsivity due to underlying cognitive impairment   pt eval likely benefit from rehab short term   b12/folate/tsh               Problem/Recommendations 2:ams   lkely underlyjng cog impairment   at risk for    sleep wake cycle regulation   redirect  consider change to obs room            pt at risk for developing delirium, therefore please institute the following preventative measures if possible          - initiating early mobilization          -minimizing "tethers" - IV, oxygen, catheters, etc          -avoiding   sedatives          -maintaining hydration/nutrition          -avoid anticholinergics - diphenhydramine, etc          -pain control          -sleep wake cycle regulation; avoid day time somnolence           -supportive environment    ___________________________  Will follow with you.  Thank you,  Yoselin Jones MD  Diplomate of the American Board of Neurology and Psychiatry.  Diplomate of the American Board of Vascular Neurology.   [facility] ([facility]), [facility]   Ph: [phone]    Differential diagnosis and plan of care discussed with patient after the evaluation.   Advanced care planning options discussed.   Pain assessed and judicious use of narcotics when appropriate was discussed.  Importance of Fall prevention discussed.  Counseling on Smoking and Alcohol cessation was offered when appropriate.  Counseling on Diet, exercise, and medication compliance was done.   83 minutes spent on the total encounter;  more than 50 % of the visit was spent on counseling  and or coordinating care by the attending physician.    Thank you for allowing me to participate in the care of this [facility] patient. Please do not hesitate to call me if you have any questions.     This and subsequent notes  will  inherently be subject to errors including those of syntax and substitutions which may escape proofreading. In such instances original meaning may be extrapolated by contextual derivation.

## 2023-04-23 NOTE — CONSULT NOTE ADULT - ASSESSMENT
A/P    77 year old man with HTN, HLD, p/w leg weakness, weight loss associated with multiple falls, most recent this morning, fell onto the side.  Had recent  work-up for this, including MRI brain, MRI C/T/L-spine, significant for moderate neuroforaminal stenosis of the C-spine, no major issues to the L-spine.  Patient also had outpatient echo, stress test, all normal.      #s/p multiple falls, SDH  -SDH on CT scan   -neurosx f/u  -neuro w/u  -check echo   -check orthostatics    #Weight loss  -w/u per med  -ct c/a/p noted    #HTN  -restart losartan   -restart metoprolol       dvt ppx      70 minutes spent on total encounter; more than 50% of the visit was spent counseling and/or coordinating care by the attending physician.

## 2023-04-23 NOTE — CONSULT NOTE ADULT - SUBJECTIVE AND OBJECTIVE BOX
CARDIOLOGY CONSULT NOTE - DR. QUINONEZ    HPI:  76YO M hx HTN, HLD, p/w leg weakness, weight loss. Onset months prior, associated with multiple falls, most recent this morning, fell onto the side.  Also endorses fall with head strike 2 days prior, patient at baseline mental status.  Patient has had significant work-up for this, including MRI brain, MRI C/T/L-spine, significant for moderate neuroforaminal stenosis of the C-spine, no major issues to the L-spine.  Patient also had outpatient echo, stress test, all normal.  Per son, has had 10 pounds of weight loss over the past 2 weeks, appears very thin, decreased p.o. intake.  Denies any recent blurry vision, numbness/paresthesias to arms/legs, headache, neck pain/stiffness.  denies recent fevers, cough, shortness of breath, abdominal pain, back pain.  Patient not on blood thinner. (22 Apr 2023 14:57)    no new events, complaints      PAST MEDICAL & SURGICAL HISTORY:  No pertinent past medical history      No significant past surgical history            PREVIOUS DIAGNOSTIC TESTING:    [ ] Echocardiogram:  [ ]  Catheterization:  [ ] Stress Test:  	    MEDICATIONS:    Home Medications:  aspirin 81 mg oral delayed release tablet: 1 tab(s) orally once a day (22 Apr 2023 18:29)  Breo Ellipta 200 mcg-25 mcg/inh inhalation powder: 1 puff(s) inhaled once a day (22 Apr 2023 18:29)  dexlansoprazole 60 mg oral delayed release capsule: 1 cap(s) orally once a day (22 Apr 2023 18:29)  losartan 50 mg oral tablet: 1 tab(s) orally once a day (22 Apr 2023 18:29)  metFORMIN 850 mg oral tablet: 1 tab(s) orally 2 times a day (22 Apr 2023 18:29)  metoprolol succinate 25 mg oral tablet, extended release: 1 tab(s) orally once a day (22 Apr 2023 18:29)  simvastatin 40 mg oral tablet: 1 tab(s) orally once a day (22 Apr 2023 18:29)  tamsulosin 0.4 mg oral capsule: 1 cap(s) orally once a day (22 Apr 2023 18:29)  traZODone 100 mg oral tablet: 1 tab(s) orally once a day (at bedtime) (22 Apr 2023 18:29)  Vascepa 1 g oral capsule: 2 cap(s) orally 2 times a day (22 Apr 2023 18:29)      MEDICATIONS  (STANDING):  dextrose 5%. 1000 milliLiter(s) (50 mL/Hr) IV Continuous <Continuous>  dextrose 5%. 1000 milliLiter(s) (100 mL/Hr) IV Continuous <Continuous>  dextrose 50% Injectable 12.5 Gram(s) IV Push once  dextrose 50% Injectable 25 Gram(s) IV Push once  dextrose 50% Injectable 25 Gram(s) IV Push once  glucagon  Injectable 1 milliGRAM(s) IntraMuscular once  insulin lispro (ADMELOG) corrective regimen sliding scale   SubCutaneous three times a day before meals  insulin lispro Injectable (ADMELOG) 3 Unit(s) SubCutaneous before breakfast  insulin lispro Injectable (ADMELOG) 3 Unit(s) SubCutaneous before dinner  insulin lispro Injectable (ADMELOG) 3 Unit(s) SubCutaneous before lunch  levETIRAcetam 500 milliGRAM(s) Oral two times a day  pantoprazole    Tablet 40 milliGRAM(s) Oral before breakfast      FAMILY HISTORY:      SOCIAL HISTORY:    [ ] Non-smoker  [ ] Smoker  [ ] Alcohol    Allergies    No Known Allergies    Intolerances    	    REVIEW OF SYSTEMS:  CONSTITUTIONAL: No fever, weight loss, or fatigue  EYES: No eye pain, visual disturbances, or discharge  ENMT:  No difficulty hearing, tinnitus, vertigo; No sinus or throat pain  NECK: No pain or stiffness  RESPIRATORY: No cough, wheezing, chills or hemoptysis; No Shortness of Breath  CARDIOVASCULAR: as HPI  GASTROINTESTINAL: No abdominal or epigastric pain. No nausea, vomiting, or hematemesis; No diarrhea or constipation. No melena or hematochezia.  GENITOURINARY: No dysuria, frequency, hematuria, or incontinence  NEUROLOGICAL: No headaches, memory loss, loss of strength, numbness, or tremors  SKIN: No itching, burning, rashes, or lesions   	  [ ] All others negative	  [ ] Unable to obtain    PHYSICAL EXAM:    T(C): 36.4 (04-23-23 @ 09:19), Max: 37 (04-22-23 @ 16:40)  HR: 101 (04-23-23 @ 09:19) (95 - 104)  BP: 131/79 (04-23-23 @ 09:19) (109/74 - 144/78)  RR: 18 (04-23-23 @ 09:19) (16 - 18)  SpO2: 97% (04-23-23 @ 09:19) (97% - 100%)  Wt(kg): --  I&O's Summary    22 Apr 2023 07:01  -  23 Apr 2023 07:00  --------------------------------------------------------  IN: 50 mL / OUT: 0 mL / NET: 50 mL      Daily     Daily     Appearance: Normal	  Psychiatry: A & O x 3, Mood & affect appropriate  HEENT:   Normal oral mucosa, PERRL, EOMI	  Lymphatic: No lymphadenopathy  Cardiovascular: Normal S1 S2,RRR, No JVD, No murmurs  Respiratory: Lungs clear to auscultation	  Gastrointestinal:  Soft, Non-tender, + BS	  Skin: No rashes, No ecchymoses, No cyanosis	  Neurologic: Non-focal  Extremities: Normal range of motion, No clubbing, cyanosis or edema  Vascular: Peripheral pulses palpable 2+ bilaterally    TELEMETRY: 	    ECG:  	  RADIOLOGY:  OTHER: 	  	  LABS:	 	    CARDIAC MARKERS:        proBNP:     Lipid Profile:   HgA1c:   TSH:                           14.1   5.84  )-----------( 218      ( 22 Apr 2023 11:22 )             42.3     04-22    136  |  98  |  17  ----------------------------<  161<H>  4.5   |  26  |  0.93    Ca    9.7      22 Apr 2023 11:22  Phos  3.2     04-22  Mg     2.0     04-22    TPro  7.3  /  Alb  4.0  /  TBili  0.7  /  DBili  x   /  AST  36  /  ALT  19  /  AlkPhos  56  04-22    PT/INR - ( 22 Apr 2023 16:32 )   PT: 12.0 sec;   INR: 1.04 ratio         PTT - ( 22 Apr 2023 16:32 )  PTT:28.5 sec    Creatinine, Serum: 0.93 mg/dL (04-22-23 @ 11:22)        ASSESSMENT/PLAN:

## 2023-04-23 NOTE — PROVIDER CONTACT NOTE (OTHER) - ASSESSMENT
Patient alert and oriented x1-2. Turkmen speaking. Unable to utilize  IPAD for patient. Not cooperating. Incontinence care provided for patient and still restless.

## 2023-04-23 NOTE — PROVIDER CONTACT NOTE (OTHER) - ACTION/TREATMENT ORDERED:
NP notified. x1 dose IV tylenol and x1 dose trazodone 100mg ordered and given. Will continue to monitor.

## 2023-04-24 LAB
A1C WITH ESTIMATED AVERAGE GLUCOSE RESULT: 8 % — HIGH (ref 4–5.6)
ALBUMIN SERPL ELPH-MCNC: 4.3 G/DL — SIGNIFICANT CHANGE UP (ref 3.3–5)
ALP SERPL-CCNC: 67 U/L — SIGNIFICANT CHANGE UP (ref 40–120)
ALT FLD-CCNC: 17 U/L — SIGNIFICANT CHANGE UP (ref 10–45)
ANION GAP SERPL CALC-SCNC: 17 MMOL/L — SIGNIFICANT CHANGE UP (ref 5–17)
AST SERPL-CCNC: 23 U/L — SIGNIFICANT CHANGE UP (ref 10–40)
BILIRUB SERPL-MCNC: 0.8 MG/DL — SIGNIFICANT CHANGE UP (ref 0.2–1.2)
BUN SERPL-MCNC: 15 MG/DL — SIGNIFICANT CHANGE UP (ref 7–23)
CALCIUM SERPL-MCNC: 9.6 MG/DL — SIGNIFICANT CHANGE UP (ref 8.4–10.5)
CHLORIDE SERPL-SCNC: 94 MMOL/L — LOW (ref 96–108)
CHOLEST SERPL-MCNC: 131 MG/DL — SIGNIFICANT CHANGE UP
CO2 SERPL-SCNC: 23 MMOL/L — SIGNIFICANT CHANGE UP (ref 22–31)
CREAT SERPL-MCNC: 0.95 MG/DL — SIGNIFICANT CHANGE UP (ref 0.5–1.3)
EGFR: 82 ML/MIN/1.73M2 — SIGNIFICANT CHANGE UP
ESTIMATED AVERAGE GLUCOSE: 183 MG/DL — HIGH (ref 68–114)
GLUCOSE BLDC GLUCOMTR-MCNC: 107 MG/DL — HIGH (ref 70–99)
GLUCOSE BLDC GLUCOMTR-MCNC: 120 MG/DL — HIGH (ref 70–99)
GLUCOSE BLDC GLUCOMTR-MCNC: 229 MG/DL — HIGH (ref 70–99)
GLUCOSE BLDC GLUCOMTR-MCNC: 70 MG/DL — SIGNIFICANT CHANGE UP (ref 70–99)
GLUCOSE SERPL-MCNC: 123 MG/DL — HIGH (ref 70–99)
HCT VFR BLD CALC: 45.4 % — SIGNIFICANT CHANGE UP (ref 39–50)
HDLC SERPL-MCNC: 61 MG/DL — SIGNIFICANT CHANGE UP
HGB BLD-MCNC: 15.3 G/DL — SIGNIFICANT CHANGE UP (ref 13–17)
LIPID PNL WITH DIRECT LDL SERPL: 54 MG/DL — SIGNIFICANT CHANGE UP
MCHC RBC-ENTMCNC: 30.4 PG — SIGNIFICANT CHANGE UP (ref 27–34)
MCHC RBC-ENTMCNC: 33.7 GM/DL — SIGNIFICANT CHANGE UP (ref 32–36)
MCV RBC AUTO: 90.1 FL — SIGNIFICANT CHANGE UP (ref 80–100)
NON HDL CHOLESTEROL: 69 MG/DL — SIGNIFICANT CHANGE UP
NRBC # BLD: 0 /100 WBCS — SIGNIFICANT CHANGE UP (ref 0–0)
PLATELET # BLD AUTO: 249 K/UL — SIGNIFICANT CHANGE UP (ref 150–400)
POTASSIUM SERPL-MCNC: 3.4 MMOL/L — LOW (ref 3.5–5.3)
POTASSIUM SERPL-SCNC: 3.4 MMOL/L — LOW (ref 3.5–5.3)
PROT SERPL-MCNC: 7.8 G/DL — SIGNIFICANT CHANGE UP (ref 6–8.3)
RBC # BLD: 5.04 M/UL — SIGNIFICANT CHANGE UP (ref 4.2–5.8)
RBC # FLD: 12.6 % — SIGNIFICANT CHANGE UP (ref 10.3–14.5)
SODIUM SERPL-SCNC: 134 MMOL/L — LOW (ref 135–145)
TRIGL SERPL-MCNC: 75 MG/DL — SIGNIFICANT CHANGE UP
WBC # BLD: 9.54 K/UL — SIGNIFICANT CHANGE UP (ref 3.8–10.5)
WBC # FLD AUTO: 9.54 K/UL — SIGNIFICANT CHANGE UP (ref 3.8–10.5)

## 2023-04-24 RX ORDER — METOPROLOL TARTRATE 50 MG
25 TABLET ORAL DAILY
Refills: 0 | Status: DISCONTINUED | OUTPATIENT
Start: 2023-04-24 | End: 2023-04-27

## 2023-04-24 RX ORDER — POTASSIUM CHLORIDE 20 MEQ
40 PACKET (EA) ORAL EVERY 4 HOURS
Refills: 0 | Status: COMPLETED | OUTPATIENT
Start: 2023-04-24 | End: 2023-04-24

## 2023-04-24 RX ADMIN — Medication 40 MILLIEQUIVALENT(S): at 10:13

## 2023-04-24 RX ADMIN — Medication 3 UNIT(S): at 08:24

## 2023-04-24 RX ADMIN — Medication 2: at 17:11

## 2023-04-24 RX ADMIN — Medication 3 UNIT(S): at 17:11

## 2023-04-24 RX ADMIN — PANTOPRAZOLE SODIUM 40 MILLIGRAM(S): 20 TABLET, DELAYED RELEASE ORAL at 05:28

## 2023-04-24 RX ADMIN — LEVETIRACETAM 500 MILLIGRAM(S): 250 TABLET, FILM COATED ORAL at 16:54

## 2023-04-24 RX ADMIN — Medication 40 MILLIEQUIVALENT(S): at 14:29

## 2023-04-24 RX ADMIN — LEVETIRACETAM 500 MILLIGRAM(S): 250 TABLET, FILM COATED ORAL at 05:28

## 2023-04-24 NOTE — PROGRESS NOTE ADULT - ASSESSMENT
A/P     #Subdural hematoma s/p fall   # syncope   small SDH on CT scan   seen by neurosurgery : no ac intervention   -pt. has recurrent fall   -consult neurology Dr. Jones : done     Weight loss / cachexia  -as per son lost 10 LBS in last 2 weeks   poor appetite   CT ches/abd/pelvis : done : wnl in ED     Atelectasis :  -on CT chest     hypokalemia   replace K     BPH   c/w flomax     HTN :   c/w losartan , hold other meds   if BP elevated , will restart all     HLD :  -hold statin or home meds     dispo: plan for d/c to rehab in am

## 2023-04-24 NOTE — PROGRESS NOTE ADULT - ASSESSMENT
A/P    77 year old man with HTN, HLD, p/w leg weakness, weight loss associated with multiple falls, most recent this morning, fell onto the side.  Had recent  work-up for this, including MRI brain, MRI C/T/L-spine, significant for moderate neuroforaminal stenosis of the C-spine, no major issues to the L-spine.  Patient also had outpatient echo, stress test, all normal.      #s/p multiple falls, SDH  -SDH on CT scan   -Per neurosx no surgical intervention  -neuro w/u  -F/u echo   -check orthostatics    #Weight loss  -w/u per med  -ct c/a/p noted    #HTN  -restart metoprolol 25mg qd  -Can hold losartan if bp unable to tolerate       Please call/text me with questions/concerns between 8am-4pm  [phone]

## 2023-04-24 NOTE — PHYSICAL THERAPY INITIAL EVALUATION ADULT - MANUAL MUSCLE TESTING RESULTS, REHAB EVAL
BUE/BLE grossly at least 3/5 throughout; difficult to access due to decreased ability to follow commands/grossly assessed due to

## 2023-04-24 NOTE — PHYSICAL THERAPY INITIAL EVALUATION ADULT - TRANSFER TRAINING, PT EVAL
GOAL: Pt will perform ALL transfers with CGA x1 assist, w/use of appropriate assistive device as needed, in 4 weeks.

## 2023-04-24 NOTE — PHYSICAL THERAPY INITIAL EVALUATION ADULT - PERTINENT HX OF CURRENT PROBLEM, REHAB EVAL
76 yo Male with PMHx of HTN, HLD, CAD with stent 2yr prior on ASA p/w generalized weakness and multiple recent falls. Last fall with headstrike 2d ago. Pt has lost 10lb over the last two weeks, recent outpatient MR neuroaxis w/C3/4, C4/5 disks with moderate canal stenosis, c/o neck pain w/radiculopathy. CTH w/small L subacute SDH w/o MLS or significant mass effect.    Dx:	L subacute SDH 2/2 fall- Keppra x7 days; neurosx following; CTH x3 stable 78 yo Male with PMHx of HTN, HLD, CAD with stent 2yr prior on ASA p/w generalized weakness and multiple recent falls. Onset months prior, associated with multiple falls, most recent morning of admission, fell onto the side. Also endorses fall with head strike 2 days prior, pt at baseline mental status.  Pat has had significant work-up for this, including MRI brain, MRI C/T/L-spine, significant for moderate neuroforaminal stenosis of the C-spine, no major issues to the L-spine. Pt also had outpatient echo, stress test, all normal. Per pt son, pt with 10 pounds of weight loss over the past 2 weeks, appears very thin, decreased p.o. intake.      4/23/2023 CT HEAD: Tiny LEFT posterior frontal parietal subdural hematoma unchanged. Minimal periventricular white matter ischemia is noted. Mild cerebellar atrophy is noted. Mild to moderate mucosal thickening in the BILATERAL maxillary sinuses.  4/22/2023 CT CHEST: No evidence of suspicious mass or adenopathy in the chest, abdomen, or   pelvis. Patchy airspace opacity at the left medial lung base consistent with   pneumonia versus atelectasis.

## 2023-04-24 NOTE — PHYSICAL THERAPY INITIAL EVALUATION ADULT - ADDITIONAL COMMENTS
Pt lives alone in apartment (+ elevator) and has [facility] HHAs through [facility], son is unsure of HHA hours. no use of DMEs at this time. Pt has been increasingly weak and has required more assistance in all ADLs prior to admission.

## 2023-04-24 NOTE — PHYSICAL THERAPY INITIAL EVALUATION ADULT - GAIT DEVIATIONS NOTED, PT EVAL
shuffled steps/decreased frances/decreased step length/decreased stride length/decreased weight-shifting ability

## 2023-04-25 LAB
ANION GAP SERPL CALC-SCNC: 21 MMOL/L — HIGH (ref 5–17)
BUN SERPL-MCNC: 23 MG/DL — SIGNIFICANT CHANGE UP (ref 7–23)
CALCIUM SERPL-MCNC: 9.7 MG/DL — SIGNIFICANT CHANGE UP (ref 8.4–10.5)
CHLORIDE SERPL-SCNC: 100 MMOL/L — SIGNIFICANT CHANGE UP (ref 96–108)
CO2 SERPL-SCNC: 16 MMOL/L — LOW (ref 22–31)
CREAT SERPL-MCNC: 0.97 MG/DL — SIGNIFICANT CHANGE UP (ref 0.5–1.3)
EGFR: 80 ML/MIN/1.73M2 — SIGNIFICANT CHANGE UP
GLUCOSE BLDC GLUCOMTR-MCNC: 131 MG/DL — HIGH (ref 70–99)
GLUCOSE BLDC GLUCOMTR-MCNC: 183 MG/DL — HIGH (ref 70–99)
GLUCOSE BLDC GLUCOMTR-MCNC: 203 MG/DL — HIGH (ref 70–99)
GLUCOSE SERPL-MCNC: 168 MG/DL — HIGH (ref 70–99)
POTASSIUM SERPL-MCNC: 4.6 MMOL/L — SIGNIFICANT CHANGE UP (ref 3.5–5.3)
POTASSIUM SERPL-SCNC: 4.6 MMOL/L — SIGNIFICANT CHANGE UP (ref 3.5–5.3)
SARS-COV-2 RNA SPEC QL NAA+PROBE: SIGNIFICANT CHANGE UP
SARS-COV-2 RNA SPEC QL NAA+PROBE: SIGNIFICANT CHANGE UP
SODIUM SERPL-SCNC: 137 MMOL/L — SIGNIFICANT CHANGE UP (ref 135–145)

## 2023-04-25 PROCEDURE — 93306 TTE W/DOPPLER COMPLETE: CPT | Mod: 26

## 2023-04-25 RX ORDER — INSULIN LISPRO 100/ML
VIAL (ML) SUBCUTANEOUS EVERY 6 HOURS
Refills: 0 | Status: DISCONTINUED | OUTPATIENT
Start: 2023-04-25 | End: 2023-04-27

## 2023-04-25 RX ORDER — SODIUM CHLORIDE 9 MG/ML
1000 INJECTION, SOLUTION INTRAVENOUS
Refills: 0 | Status: DISCONTINUED | OUTPATIENT
Start: 2023-04-25 | End: 2023-04-27

## 2023-04-25 RX ADMIN — LEVETIRACETAM 500 MILLIGRAM(S): 250 TABLET, FILM COATED ORAL at 06:36

## 2023-04-25 RX ADMIN — Medication 3 UNIT(S): at 08:43

## 2023-04-25 RX ADMIN — LEVETIRACETAM 500 MILLIGRAM(S): 250 TABLET, FILM COATED ORAL at 17:53

## 2023-04-25 RX ADMIN — Medication 25 MILLIGRAM(S): at 06:36

## 2023-04-25 RX ADMIN — Medication 3 UNIT(S): at 12:50

## 2023-04-25 RX ADMIN — SODIUM CHLORIDE 60 MILLILITER(S): 9 INJECTION, SOLUTION INTRAVENOUS at 16:49

## 2023-04-25 RX ADMIN — Medication 1: at 08:43

## 2023-04-25 RX ADMIN — Medication 2: at 12:49

## 2023-04-25 RX ADMIN — PANTOPRAZOLE SODIUM 40 MILLIGRAM(S): 20 TABLET, DELAYED RELEASE ORAL at 06:36

## 2023-04-25 NOTE — SWALLOW BEDSIDE ASSESSMENT ADULT - SLP GENERAL OBSERVATIONS
Patient encountered asleep in bed, on RA. Roused to auditory stimuli. Italian  services utilized. A&Ox2; vocal quality weak with decreased volume. Inconsistently followed simple commands.

## 2023-04-25 NOTE — SWALLOW BEDSIDE ASSESSMENT ADULT - SWALLOW EVAL: DIAGNOSIS
Patient admitted s/p fall. Found to have small SDH.  Per son, has had 10 pounds of weight loss over the past 2 weeks, appears very thin, decreased p.o. intake. Patient presents with an oropharyngeal dysphagia characterized by intermittent anterior loss of liquids, delayed oral transit time, delayed trigger of the swallow, throat clearing post intake of pureed solids, and coughing post intake of thin liquids.

## 2023-04-25 NOTE — PROGRESS NOTE ADULT - ASSESSMENT
A/P    77 year old man with HTN, HLD, p/w leg weakness, weight loss associated with multiple falls, most recent this morning, fell onto the side.  Had recent  work-up for this, including MRI brain, MRI C/T/L-spine, significant for moderate neuroforaminal stenosis of the C-spine, no major issues to the L-spine.  Patient also had outpatient echo, stress test, all normal.      #s/p multiple falls, SDH  -SDH on CT scan   -Per neurosx no surgical intervention  -neuro w/u  -F/u echo   -check orthostatics    #Weight loss  -w/u per med  -ct c/a/p noted    #HTN  -Continue metoprolol 25mg qd  -Can hold losartan if bp unable to tolerate     #Sinus Tachycardia  -Unclear etiology  -Continue metoprolol  -F/u echo      Please call/text me with questions/concerns between 8am-4pm  [phone]

## 2023-04-25 NOTE — SWALLOW BEDSIDE ASSESSMENT ADULT - SLP PERTINENT HISTORY OF CURRENT PROBLEM
76YO M hx HTN, HLD, p/w leg weakness, weight loss. Onset months prior, associated with multiple falls, most recent this morning, fell onto the side.  Also endorses fall with head strike 2 days prior, patient at baseline mental status.  Patient has had significant work-up for this, including MRI brain, MRI C/T/L-spine, significant for moderate neuroforaminal stenosis of the C-spine, no major issues to the L-spine.  Patient also had outpatient echo, stress test, all normal.  Per son, has had 10 pounds of weight loss over the past 2 weeks, appears very thin, decreased p.o. intake.  Denies any recent blurry vision, numbness/paresthesias to arms/legs, headache, neck pain/stiffness.  denies recent fevers, cough, shortness of breath, abdominal pain, back pain.  Patient not on blood thinner.

## 2023-04-25 NOTE — SWALLOW BEDSIDE ASSESSMENT ADULT - PHARYNGEAL PHASE
Delayed pharyngeal swallow/Throat clear post oral intake Delayed pharyngeal swallow/Cough post oral intake

## 2023-04-25 NOTE — SWALLOW BEDSIDE ASSESSMENT ADULT - COMMENTS
4/22 HEAD CT: Small subacute appearing left parietal subdural hemorrhage measuring up to 0.6 cm.  Per nsx - no intervention needed    **Swallow hx: pt not known to this service per EMR

## 2023-04-26 LAB
GLUCOSE BLDC GLUCOMTR-MCNC: 183 MG/DL — HIGH (ref 70–99)
GLUCOSE BLDC GLUCOMTR-MCNC: 184 MG/DL — HIGH (ref 70–99)
GLUCOSE BLDC GLUCOMTR-MCNC: 189 MG/DL — HIGH (ref 70–99)
GLUCOSE BLDC GLUCOMTR-MCNC: 191 MG/DL — HIGH (ref 70–99)
GLUCOSE BLDC GLUCOMTR-MCNC: 191 MG/DL — HIGH (ref 70–99)
GLUCOSE BLDC GLUCOMTR-MCNC: 289 MG/DL — HIGH (ref 70–99)
VIT B12 SERPL-MCNC: 436 PG/ML — SIGNIFICANT CHANGE UP (ref 232–1245)

## 2023-04-26 PROCEDURE — 74230 X-RAY XM SWLNG FUNCJ C+: CPT | Mod: 26

## 2023-04-26 RX ORDER — ACETAMINOPHEN 500 MG
1000 TABLET ORAL ONCE
Refills: 0 | Status: COMPLETED | OUTPATIENT
Start: 2023-04-26 | End: 2023-04-26

## 2023-04-26 RX ORDER — LANOLIN ALCOHOL/MO/W.PET/CERES
3 CREAM (GRAM) TOPICAL ONCE
Refills: 0 | Status: COMPLETED | OUTPATIENT
Start: 2023-04-26 | End: 2023-04-26

## 2023-04-26 RX ORDER — SACUBITRIL AND VALSARTAN 24; 26 MG/1; MG/1
1 TABLET, FILM COATED ORAL
Refills: 0 | Status: DISCONTINUED | OUTPATIENT
Start: 2023-04-26 | End: 2023-04-27

## 2023-04-26 RX ADMIN — Medication 25 MILLIGRAM(S): at 05:05

## 2023-04-26 RX ADMIN — Medication 400 MILLIGRAM(S): at 02:29

## 2023-04-26 RX ADMIN — Medication 1: at 13:03

## 2023-04-26 RX ADMIN — SODIUM CHLORIDE 60 MILLILITER(S): 9 INJECTION, SOLUTION INTRAVENOUS at 05:05

## 2023-04-26 RX ADMIN — LEVETIRACETAM 500 MILLIGRAM(S): 250 TABLET, FILM COATED ORAL at 17:45

## 2023-04-26 RX ADMIN — PANTOPRAZOLE SODIUM 40 MILLIGRAM(S): 20 TABLET, DELAYED RELEASE ORAL at 05:06

## 2023-04-26 RX ADMIN — Medication 3 MILLIGRAM(S): at 22:49

## 2023-04-26 RX ADMIN — SODIUM CHLORIDE 60 MILLILITER(S): 9 INJECTION, SOLUTION INTRAVENOUS at 06:34

## 2023-04-26 RX ADMIN — Medication 1: at 06:15

## 2023-04-26 RX ADMIN — Medication 1000 MILLIGRAM(S): at 02:45

## 2023-04-26 RX ADMIN — Medication 3 MILLIGRAM(S): at 00:21

## 2023-04-26 RX ADMIN — Medication 1000 MILLIGRAM(S): at 23:31

## 2023-04-26 RX ADMIN — Medication 1: at 00:21

## 2023-04-26 RX ADMIN — Medication 1: at 18:03

## 2023-04-26 RX ADMIN — LEVETIRACETAM 500 MILLIGRAM(S): 250 TABLET, FILM COATED ORAL at 05:06

## 2023-04-26 RX ADMIN — Medication 400 MILLIGRAM(S): at 22:49

## 2023-04-26 RX ADMIN — SACUBITRIL AND VALSARTAN 1 TABLET(S): 24; 26 TABLET, FILM COATED ORAL at 17:45

## 2023-04-26 NOTE — CHART NOTE - NSCHARTNOTEFT_GEN_A_CORE
Spoke to pt family regarding pt condition. According to son patient has been having dysphagia and increased falling and leg weakness x few weeks. Family also noticed pt having difficulty with speaking for past few weeks. He is aware palliative is called and Dr Deutsch made aware

## 2023-04-26 NOTE — SWALLOW VFSS/MBS ASSESSMENT ADULT - PHARYNGEAL PHASE COMMENTS
Mild silent aspiration after the swallow and on repeat swallows of bolus and mixed residue with secretions, without retrieval. Delayed weak cough noted.

## 2023-04-26 NOTE — SWALLOW VFSS/MBS ASSESSMENT ADULT - DIAGNOSTIC IMPRESSIONS
Pt is 76 y/o M admitted s/p fall and found to have small SDH. Now presenting with a severe oropharyngeal dysphagia marked by oral holding, poor bolus formation, lingual pumping, post swallow oral cavity and pharyngeal residue, and silent aspiration during and after the swallow without retrieval.   Disorders: reduced lingual strength/ROM/Rate of motion, reduced BOT to posterior pharyngeal wall contact, reduced hyo-laryngeal excursion, reduced laryngeal closure, reduced pharyngeal contractility, reduced supraglottic sensation, reduced subglottic sensation.

## 2023-04-26 NOTE — SWALLOW VFSS/MBS ASSESSMENT ADULT - SLP GENERAL OBSERVATIONS
Pt arrived to radiology secured in DASH chair, flat affect. Reduced vocal volume. Latent responsiveness to verbal cues. Reduced eye contact.  services used ([employer] [id number]).

## 2023-04-26 NOTE — PROGRESS NOTE ADULT - ASSESSMENT
Echo 4/26/23: Severely decreased LV fxn EF 26%, global LV hypokinesis    A/P    77 year old man with HTN, HLD, p/w leg weakness, weight loss associated with multiple falls, most recent this morning, fell onto the side.  Had recent  work-up for this, including MRI brain, MRI C/T/L-spine, significant for moderate neuroforaminal stenosis of the C-spine, no major issues to the L-spine.  Patient also had outpatient echo, stress test, all normal.      #s/p multiple falls, SDH  -SDH on CT scan   -Per neurosx no surgical intervention  -neuro w/u  -Echo with severely decreased lv fxn  -Would defer ischemic eval given pt clinical status, he is not candidate for dapt    #Weight loss  -w/u per med  -ct c/a/p noted    #HTN  -Continue metoprolol 25mg qd  -Can hold losartan if bp unable to tolerate     #Sinus Tachycardia  -Unclear etiology  -Continue metoprolol  -Echo as above      Please call/text me with questions/concerns between 8am-4pm  [phone] Echo 4/26/23: Severely decreased LV fxn EF 26%, global LV hypokinesis    A/P    77 year old man with HTN, HLD, p/w leg weakness, weight loss associated with multiple falls, most recent this morning, fell onto the side.  Had recent  work-up for this, including MRI brain, MRI C/T/L-spine, significant for moderate neuroforaminal stenosis of the C-spine, no major issues to the L-spine.  Patient also had outpatient echo, stress test, all normal.      #s/p multiple falls, SDH  -SDH on CT scan   -Per neurosx no surgical intervention  -neuro w/u  -Echo with severely decreased lv fxn  -Would defer ischemic eval given pt clinical status, he is not candidate for dapt    #Weight loss  -w/u per med  -ct c/a/p noted    #HTN  -Continue metoprolol 25mg qd  -Can hold losartan if bp unable to tolerate     #Sinus Tachycardia  -Unclear etiology  -increase metoprolol to 50mg qd  -Echo as above      Please call/text me with questions/concerns between 8am-4pm  [phone] Echo 4/26/23: Severely decreased LV fxn EF 26%, global LV hypokinesis    A/P    77 year old man with HTN, HLD, p/w leg weakness, weight loss associated with multiple falls, most recent this morning, fell onto the side.  Had recent  work-up for this, including MRI brain, MRI C/T/L-spine, significant for moderate neuroforaminal stenosis of the C-spine, no major issues to the L-spine.  Patient also had outpatient echo, stress test, all normal.      #s/p multiple falls, SDH  -SDH on CT scan   -Per neurosx no surgical intervention  -neuro w/u  -Echo with severely decreased lv fxn  -Would defer ischemic eval given pt clinical status, he is not candidate for dapt    #HFrEF  -As noted on echo  -Increase metoprolol to 50mg qd  -Recommend entresto    #Weight loss  -w/u per med  -ct c/a/p noted    #HTN  -Continue metoprolol 25mg qd  -Entresto as above    #Sinus Tachycardia  -Unclear etiology  -increase metoprolol to 50mg qd  -Echo as above      Please call/text me with questions/concerns between 8am-4pm  [phone]

## 2023-04-26 NOTE — SWALLOW VFSS/MBS ASSESSMENT ADULT - H & P REVIEW
76YO M hx HTN, HLD, p/w leg weakness, weight loss. Onset months prior, associated with multiple falls, most recent this morning, fell onto the side.  Also endorses fall with head strike 2 days prior, patient at baseline mental status.  Patient has had significant work-up for this, including MRI brain, MRI C/T/L-spine, significant for moderate neuroforaminal stenosis of the C-spine, no major issues to the L-spine.  Patient also had outpatient echo, stress test, all normal.  Per son, has had 10 pounds of weight loss over the past 2 weeks, appears very thin, decreased p.o. intake.  Denies any recent blurry vision, numbness/paresthesias to arms/legs, headache, neck pain/stiffness.  denies recent fevers, cough, shortness of breath, abdominal pain, back pain.  Patient not on blood thinner./yes

## 2023-04-26 NOTE — PROGRESS NOTE ADULT - ASSESSMENT
A/P     #Subdural hematoma s/p fall   # syncope   small SDH on CT scan   seen by neurosurgery : no ac intervention   -pt. has recurrent fall   -consult neurology Dr. Jones : done     Weight loss / cachexia  -as per son lost 10 LBS in last 2 weeks   poor appetite   CT ches/abd/pelvis : done : wnl     dysphagia :   failed S&S eval   recommend NPO   called family : regarding peg tube     Atelectasis :  -on CT chest     hypokalemia   replace K     BPH   c/w flomax     HTN :   c/w losartan , hold other meds   if BP elevated , will restart all     HLD :  -hold statin or home meds     dispo: palliative care consult for [facility] , will d/w family regarding peg tube vs comfort care/ pleasure feeding ?

## 2023-04-27 VITALS
RESPIRATION RATE: 18 BRPM | OXYGEN SATURATION: 96 % | DIASTOLIC BLOOD PRESSURE: 75 MMHG | TEMPERATURE: 98 F | HEART RATE: 82 BPM | SYSTOLIC BLOOD PRESSURE: 116 MMHG

## 2023-04-27 LAB
GLUCOSE BLDC GLUCOMTR-MCNC: 112 MG/DL — HIGH (ref 70–99)
GLUCOSE BLDC GLUCOMTR-MCNC: 145 MG/DL — HIGH (ref 70–99)
GLUCOSE BLDC GLUCOMTR-MCNC: 205 MG/DL — HIGH (ref 70–99)
GLUCOSE BLDC GLUCOMTR-MCNC: 223 MG/DL — HIGH (ref 70–99)
GLUCOSE BLDC GLUCOMTR-MCNC: 258 MG/DL — HIGH (ref 70–99)

## 2023-04-27 PROCEDURE — 92610 EVALUATE SWALLOWING FUNCTION: CPT

## 2023-04-27 PROCEDURE — 83036 HEMOGLOBIN GLYCOSYLATED A1C: CPT

## 2023-04-27 PROCEDURE — 97162 PT EVAL MOD COMPLEX 30 MIN: CPT

## 2023-04-27 PROCEDURE — 70450 CT HEAD/BRAIN W/O DYE: CPT

## 2023-04-27 PROCEDURE — 71260 CT THORAX DX C+: CPT | Mod: MD

## 2023-04-27 PROCEDURE — 36415 COLL VENOUS BLD VENIPUNCTURE: CPT

## 2023-04-27 PROCEDURE — 92611 MOTION FLUOROSCOPY/SWALLOW: CPT

## 2023-04-27 PROCEDURE — U0005: CPT

## 2023-04-27 PROCEDURE — 96374 THER/PROPH/DIAG INJ IV PUSH: CPT

## 2023-04-27 PROCEDURE — 82607 VITAMIN B-12: CPT

## 2023-04-27 PROCEDURE — C8929: CPT

## 2023-04-27 PROCEDURE — 84100 ASSAY OF PHOSPHORUS: CPT

## 2023-04-27 PROCEDURE — 74230 X-RAY XM SWLNG FUNCJ C+: CPT

## 2023-04-27 PROCEDURE — 82962 GLUCOSE BLOOD TEST: CPT

## 2023-04-27 PROCEDURE — 85610 PROTHROMBIN TIME: CPT

## 2023-04-27 PROCEDURE — 74177 CT ABD & PELVIS W/CONTRAST: CPT | Mod: MD

## 2023-04-27 PROCEDURE — 85730 THROMBOPLASTIN TIME PARTIAL: CPT

## 2023-04-27 PROCEDURE — U0003: CPT

## 2023-04-27 PROCEDURE — 99285 EMERGENCY DEPT VISIT HI MDM: CPT

## 2023-04-27 PROCEDURE — 85027 COMPLETE CBC AUTOMATED: CPT

## 2023-04-27 PROCEDURE — 80048 BASIC METABOLIC PNL TOTAL CA: CPT

## 2023-04-27 PROCEDURE — 83735 ASSAY OF MAGNESIUM: CPT

## 2023-04-27 PROCEDURE — 80053 COMPREHEN METABOLIC PANEL: CPT

## 2023-04-27 PROCEDURE — 80061 LIPID PANEL: CPT

## 2023-04-27 PROCEDURE — 85025 COMPLETE CBC W/AUTO DIFF WBC: CPT

## 2023-04-27 RX ORDER — ICOSAPENT ETHYL 500 MG/1
2 CAPSULE, LIQUID FILLED ORAL
Refills: 0 | DISCHARGE

## 2023-04-27 RX ORDER — LOSARTAN POTASSIUM 100 MG/1
1 TABLET, FILM COATED ORAL
Refills: 0 | DISCHARGE

## 2023-04-27 RX ORDER — THIAMINE MONONITRATE (VIT B1) 100 MG
500 TABLET ORAL THREE TIMES A DAY
Refills: 0 | Status: DISCONTINUED | OUTPATIENT
Start: 2023-04-27 | End: 2023-04-27

## 2023-04-27 RX ORDER — METOPROLOL TARTRATE 50 MG
1 TABLET ORAL
Qty: 0 | Refills: 0 | DISCHARGE
Start: 2023-04-27

## 2023-04-27 RX ORDER — DEXLANSOPRAZOLE 30 MG/1
1 CAPSULE, DELAYED RELEASE ORAL
Refills: 0 | DISCHARGE

## 2023-04-27 RX ORDER — LEVETIRACETAM 250 MG/1
1 TABLET, FILM COATED ORAL
Qty: 0 | Refills: 0 | DISCHARGE
Start: 2023-04-27 | End: 2023-04-29

## 2023-04-27 RX ORDER — FLUTICASONE FUROATE AND VILANTEROL TRIFENATATE 100; 25 UG/1; UG/1
1 POWDER RESPIRATORY (INHALATION)
Refills: 0 | DISCHARGE

## 2023-04-27 RX ORDER — SACUBITRIL AND VALSARTAN 24; 26 MG/1; MG/1
1 TABLET, FILM COATED ORAL
Qty: 0 | Refills: 0 | DISCHARGE
Start: 2023-04-27

## 2023-04-27 RX ORDER — METOPROLOL TARTRATE 50 MG
1 TABLET ORAL
Refills: 0 | DISCHARGE

## 2023-04-27 RX ADMIN — SACUBITRIL AND VALSARTAN 1 TABLET(S): 24; 26 TABLET, FILM COATED ORAL at 05:21

## 2023-04-27 RX ADMIN — Medication 25 MILLIGRAM(S): at 05:21

## 2023-04-27 RX ADMIN — SODIUM CHLORIDE 60 MILLILITER(S): 9 INJECTION, SOLUTION INTRAVENOUS at 05:21

## 2023-04-27 RX ADMIN — Medication 3 UNIT(S): at 12:19

## 2023-04-27 RX ADMIN — Medication 3 UNIT(S): at 17:40

## 2023-04-27 RX ADMIN — SACUBITRIL AND VALSARTAN 1 TABLET(S): 24; 26 TABLET, FILM COATED ORAL at 18:09

## 2023-04-27 RX ADMIN — Medication 3: at 12:20

## 2023-04-27 RX ADMIN — PANTOPRAZOLE SODIUM 40 MILLIGRAM(S): 20 TABLET, DELAYED RELEASE ORAL at 05:20

## 2023-04-27 RX ADMIN — LEVETIRACETAM 500 MILLIGRAM(S): 250 TABLET, FILM COATED ORAL at 05:21

## 2023-04-27 RX ADMIN — LEVETIRACETAM 500 MILLIGRAM(S): 250 TABLET, FILM COATED ORAL at 17:41

## 2023-04-27 RX ADMIN — Medication 105 MILLIGRAM(S): at 14:39

## 2023-04-27 RX ADMIN — Medication 2: at 00:35

## 2023-04-27 NOTE — PROGRESS NOTE ADULT - TIME BILLING
agree with above  cv stable  cont conservative mgmt of SDH  Followup echo  check orthostatics  restart metoprolol 25mg qd
agree with above  cv stable  cont conservative mgmt of SDH  Followup echo  check orthostatics  restart metoprolol 25mg qd
agree with above  cv stable  cont conservative mgmt of SDH  echo with severely decreased lv fxn  defer ischemic eval given pt clinical status, he is not candidate for dapt  cont metoprolol/entresto
agree with above  cv stable  cont conservative mgmt of SDH  echo with severely decreased lv fxn  would defer ischemic eval given pt clinical status, he is not candidate for dapt  cont metoprolol  start low dose entresto

## 2023-04-27 NOTE — PROGRESS NOTE ADULT - TIME-BASED BILLING (NON-CRITICAL CARE)
[facility]  67 y o   male  1947  mrn [id number]    Assessment/Plan:    1  Cellulitis LLE: strange that it developed during this hospitalization while on abx, though given chronic venous stasis he would be at risk for it   Given clinical improvement I would continue vancomycin a few more days  Increased WBC and cr noted  Will follow  Plan d/c on oral abx when ready  Subjective:  No fevers  Feeling well  Got out of bed by himself this am, states his legs and feet swelling are sig  Improved, even from baseline    WBC is up today, as well as cr      Objective:    afebrile  Sitting in mechanical chair  Cor: rrr s1s2  Lungs: cta bilaterally  Abd: soft nt/nd + BS  Ext: + bilateral LE edema, improved but still edematous L> R      Labs:  CBC w/diff  Recent Labs     03/02/20  0516   WBC 18 83*   HGB 11 3*   HCT 34 2*      LYMPHOPCT 7*   MONOPCT 12   EOSPCT 0     BMP  Recent Labs     03/02/20  0516   K 4 1   CL 96*   CO2 28   BUN 43*   CREATININE 1 58*   CALCIUM 8 7     CMP  Recent Labs     03/02/20  0516   K 4 1   CL 96*   CO2 28   BUN 43*   CREATININE 1 58*   CALCIUM 8 7        labrc    Cultures:  No results found for: BLOODCX  Lab Results   Component Value Date    WOUNDCULT 3+ Growth of Serratia marcescens (A) 07/24/2019    WOUNDCULT 1+ Growth of  07/24/2019    WOUNDCULT (A) 01/02/2019     1+ Growth of Methicillin Resistant Staphylococcus aureus     Lab Results   Component Value Date    URINECX <10,000 cfu/ml  02/19/2020     No results found for: SPUTUMCULTUR    MED: reviewed      Current Facility-Administered Medications:     albumin human (FLEXBUMIN) 25 % injection 12 5 g, 12 5 g, Intravenous, Once, Tristin Ascencio PA-C, Stopped at 02/20/20 9193    aluminum-magnesium hydroxide-simethicone (MYLANTA) 200-200-20 mg/5 mL oral suspension 30 mL, 30 mL, Oral, Q4H PRN, [place] Robin, DO    apixaban (ELIQUIS) tablet 5 mg, 5 mg, Oral, BID, NAVNEET [place], 5 mg at 03/02/20 6247    ascorbic
acid (VITAMIN C) tablet 1,000 mg, 1,000 mg, Oral, Daily, NAVNEET [place], 1,000 mg at 03/02/20 3764    benzonatate (TESSALON PERLES) capsule 100 mg, 100 mg, Oral, TID PRN, NAVNEET Griffiths    cholecalciferol (VITAMIN D3) tablet 1,000 Units, 1,000 Units, Oral, Daily, NAVNEET [place], 1,000 Units at 03/02/20 0839    DULoxetine (CYMBALTA) delayed release capsule 30 mg, 30 mg, Oral, Daily, NAVNEET [place], 30 mg at 03/02/20 5244    fluconazole (DIFLUCAN) tablet 200 mg, 200 mg, Oral, Daily, Fenton Homans, MD, 200 mg at 03/02/20 0839    HYDROcodone-acetaminophen (NORCO) 5-325 mg per tablet 2 tablet, 2 tablet, Oral, Q4H PRN, NAVNEET [place], 2 tablet at 03/02/20 1027    metoprolol tartrate (LOPRESSOR) tablet 25 mg, 25 mg, Oral, Q12H [address], Fenton Homans, MD, Stopped at 03/02/20 0839    nystatin (MYCOSTATIN) oral suspension 500,000 Units, 500,000 Units, Swish & Swallow, 4x Daily, Paula Dhaliwal MD, 500,000 Units at 03/02/20 0552    pantoprazole (PROTONIX) EC tablet 40 mg, 40 mg, Oral, Daily, NAVNEET [place], 40 mg at 03/02/20 0640    senna (SENOKOT) tablet 17 2 mg, 2 tablet, Oral, Daily, [employer], DO, 17 2 mg at 02/28/20 0901    sodium chloride (OCEAN) 0 65 % nasal spray 2 spray, 2 spray, Each Nare, Q1H PRN, Fenton Homans, MD, 2 spray at 02/28/20 0903    vancomycin (VANCOCIN) IVPB (premix) 750 mg, 750 mg, Intravenous, Q24H, [employer], DO, Last Rate: 150 mL/hr at 03/02/20 0024, 750 mg at 03/02/20 3800    Principal Problem:    Cellulitis of right lower extremity  Active Problems:    Gastroesophageal reflux disease without esophagitis    Class 1 obesity due to excess calories with serious comorbidity and body mass index (BMI) of 34 0 to 34 9 in adult    Persistent atrial fibrillation    Cellulitis of left lower extremity    Chronic deep vein thrombosis (DVT) (HCC)    Acute kidney injury superimposed on CKD (HCC)    Chronic combined systolic and diastolic
congestive heart failure (HCC)    Hypertension    History of methicillin resistant staphylococcus aureus (MRSA)    Hyponatremia    Oral candidiasis      Dhaval Eldridge MD
Time-based billing (NON-critical care)

## 2023-04-27 NOTE — CONSULT NOTE ADULT - SUBJECTIVE AND OBJECTIVE BOX
HPI:  78YO M hx HTN, HLD, p/w leg weakness, weight loss. Onset months prior, associated with multiple falls, most recent this morning, fell onto the side.  Also endorses fall with head strike 2 days prior, patient at baseline mental status.  Patient has had significant work-up for this, including MRI brain, MRI C/T/L-spine, significant for moderate neuroforaminal stenosis of the C-spine, no major issues to the L-spine.  Patient also had outpatient echo, stress test, all normal.  Per son, has had 10 pounds of weight loss over the past 2 weeks, appears very thin, decreased p.o. intake.  Denies any recent blurry vision, numbness/paresthesias to arms/legs, headache, neck pain/stiffness.  denies recent fevers, cough, shortness of breath, abdominal pain, back pain.  Patient not on blood thinner. (22 Apr 2023 14:57)        Palliative Global Assessment  A review of the paper chart has been conducted  HCP form present:               Yes and valid []               Yes but invalid []                No []   MOLST present:                   Yes and valid []               Yes but invalid []                No []  Incapacity form present:   Yes and valid []                Yes but invalid []                No []                 N/A []  Living Will:                            Yes and valid []                Yes but invalid []                No []      Family Heatlh Care Decision Act Surrogate Decision Maker [facility] Article 81 Guardian-->Spouse or domestic partner--> Adult child-->Parent-->Sibling--> Close friend      Contacts listed in the paper or electronic chart  Name  Relationship  Number(s)  Translation Required:  Spouse or Partner:  Family unit:  Family history of hematologic malignancy:  Residence: [ ] Apartment   [] House  [ ] Other  Degree of functionality in the home:  Performance Status (PPSv2):  BMI:BMI (kg/m2): 24 (04-22-23 @ 10:11)  Engagement in the home:  Employment: [ ] Currently employed [ ] Exited workforce due to illness  [ ] Retired prior to illness  [ ] No/distant history of employment   Support network (degree):  ---Family:        [ ] Low  [ ] Moderate  [ ] High  ---Neighbors: [ ] Low  [ ] Moderate  [ ] High  ---Social:         [ ] Low  [ ] Moderate  [ ] High  ---Spiritual:    [ ] Low  [ ] Moderate  [ ] High  Financial concerns:  Coping Strategies:  Caregiver burden/fatigue/needs:  Grief identified: [] Yes [] No  Medical communication and decision making preferences:    PERTINENT PM/SXH:   No pertinent past medical history      No significant past surgical history      FAMILY HISTORY:    Family Hx substance abuse [ ]yes [ ]no      ITEMS NOT CHECKED ARE NOT PRESENT    SOCIAL HISTORY:   Significant other/partner[ ]    Children[ ]    Restoration/Spirituality:    Living Situation: [ ]Home  [ ]Long term care  [ ]Rehab [ ]Other      Substance hx:  [ ]   Tobacco hx:  [ ]   Alcohol hx: [ ]   Home Opioid hx:  [ ] I-Stop Reference No:    ==================================================================  ADVANCE DIRECTIVES:    HCP Form [  ] Yes  [  ] No  DNR/MOLST  [  ] Yes  [  ] No    Living Will  [  ] Yes  [  ] No  DECISION MAKER(s):  [ ] Health Care Proxy(s)  [ ] Surrogate(s)  [ ] Guardian           Name(s): Phone Number(s):    =================================================================  BASELINE (I)ADL(s) (prior to admission):  [place]: [ ] Complete [ ] Moderate [ ]None      Allergies    No Known Allergies    Intolerances    MEDICATIONS  (STANDING):  dextrose 5% + sodium chloride 0.45%. 1000 milliLiter(s) (60 mL/Hr) IV Continuous <Continuous>  dextrose 5%. 1000 milliLiter(s) (50 mL/Hr) IV Continuous <Continuous>  dextrose 5%. 1000 milliLiter(s) (100 mL/Hr) IV Continuous <Continuous>  dextrose 50% Injectable 12.5 Gram(s) IV Push once  dextrose 50% Injectable 25 Gram(s) IV Push once  dextrose 50% Injectable 25 Gram(s) IV Push once  glucagon  Injectable 1 milliGRAM(s) IntraMuscular once  insulin lispro (ADMELOG) corrective regimen sliding scale   SubCutaneous every 6 hours  insulin lispro Injectable (ADMELOG) 3 Unit(s) SubCutaneous before breakfast  insulin lispro Injectable (ADMELOG) 3 Unit(s) SubCutaneous before dinner  insulin lispro Injectable (ADMELOG) 3 Unit(s) SubCutaneous before lunch  levETIRAcetam 500 milliGRAM(s) Oral two times a day  metoprolol succinate ER 25 milliGRAM(s) Oral daily  pantoprazole    Tablet 40 milliGRAM(s) Oral before breakfast  sacubitril 49 mG/valsartan 51 mG 1 Tablet(s) Oral two times a day  thiamine IVPB 500 milliGRAM(s) IV Intermittent three times a day    MEDICATIONS  (PRN):  dextrose Oral Gel 15 Gram(s) Oral once PRN Blood Glucose LESS THAN 70 milliGRAM(s)/deciliter    ==================================================================    PRESENT SYMPTOMS:  [ ]Unable to self-report  [ ] CPOT [ ] PAINADs [ ] RDOS  Source if other than patient:  [ ]Family   [ ]Team     Pain: [ ]yes [ ]no  QOL impact -   Location -                    Aggravating factors -  Quality -  Radiation -  Timing-  Maximum (0-10 scale):  Minimal  (0-10 scale):   Personalized Pain Goal (0-10 scale):    *PAIN AD Score:   [email or web address]/[email or web address] (press ctrl +  left click to view)    Dyspnea:                           [ ]Mild [ ]Moderate [ ]Severe    *RDOS:  0 to 2  minimal or no respiratory distress   3  mild distress  4 to 6 moderate distress  >7 severe distress  https://[email or web address]/handouts/hen/Respiratory_Distress_Observation_Scale.pdf (Ctrl +  left click to view)       Fatigue:                              [ ]Mild [ ]Moderate [ ]Severe  Drowsiness:                      [ ]Mild [ ]Moderate [ ]Severe  Nausea:                             [ ]Mild [ ]Moderate [ ]Severe  Dysgeusia:                          [ ]Mild [ ]Moderate [ ]Severe  Loss of appetite:               [ ]Mild [ ]Moderate [ ]Severe  Constipation:                    [ ]Mild [ ]Moderate [ ]Severe  Anxiety:                              [ ]Mild [ ]Moderate [ ]Severe  Depression:                       [ ]Mild [ ]Moderate [ ]Severe  Cognitive changes:            [ ]Mild [ ]Moderate [ ]Severe  Insomnia      :                    [ ]Mild [ ]Moderate [ ]Severe  Isolation:                             [ ]Mild [ ]Moderate [ ]Severe  Loneliness:                         [ ]Mild [ ]Moderate [ ]Severe  Lack of   Wellbeing:                        [ ]Mild [ ]Moderate [ ]Severe    Other Symptoms:  [ ]All other review of systems negative       ==================================================================    PCSSQ[Palliative Care Spiritual Screening Question]   -Severity (0-10):  -Score of 4 or > indicate consideration of Chaplaincy referral.  -Chaplaincy Referral:   [ ] Yes [ ] Not desired  [ ] following [ ] Deferred     Caregiver [place]:  [ ] Yes [ ] No [ ] Deferred  --Referral(s):    [ ] Accepted [ ] Not desired  --Referral Pool:  [ ] Social work referral    [ ]Patient & Family Centered Care Referral    [ ] Chaplaincy  [ ] Holistic Nurse  [ ] Volunteer    Anticipatory Grief present:    --Referral(s):    [ ] Accepted [ ] Not desired  --Referral Pool:  [ ] Social work referral    [ ] Chaplaincy  [ ]Child Life    [ ] Holistic Nurse  [ ] Volunteer      Palliative Performance Status Version 2:         %    http://[facility].org/files/news/palliative_performance_scale_ppsv2.pdf    =================================================================  PHYSICAL EXAM:  Vital Signs Last 24 Hrs  T(C): 36.5 (27 Apr 2023 04:53), Max: 36.5 (26 Apr 2023 12:55)  T(F): 97.7 (27 Apr 2023 04:53), Max: 97.7 (26 Apr 2023 12:55)  HR: 97 (27 Apr 2023 04:53) (97 - 99)  BP: 126/79 (27 Apr 2023 04:53) (121/81 - 127/84)  BP(mean): --  RR: 18 (27 Apr 2023 04:53) (18 - 18)  SpO2: 98% (27 Apr 2023 04:53) (96% - 99%)    Parameters below as of 27 Apr 2023 04:53  Patient On (Oxygen Delivery Method): room air     I&O's Summary    26 Apr 2023 07:01  -  27 Apr 2023 07:00  --------------------------------------------------------  IN: 720 mL / OUT: 100 mL / NET: 620 mL        -GENERAL:   [ ]Cachexia   [ ]Alert  [ ]Oriented    [  ]Morbid obesity  [ ]Lethargic  [ ]Unarousable  [ ]Verbal  [ ]Non-Verbal    -Behavioral:   [ ] Anxiety  Agitation [ ] Calm [ ]  [ ] Hypoactive Delirium  [ ] Hyperactive Delirium [ ] Mixed Delirium    -HEENT:  [ ]Normal   [ ]Dry mouth   [ ]ET Tube/Trach  [ ]Oral lesions  [ ] Oral bleeding [ ] Epistaxis  [ ] Mucositis Grade:    -PULMONARY:   [ ] No tachypnea  [ ]Clear [ ]Tachypnea  [ ]Audible excessive secretions   [ ]Rhonchi        [ ]Right [ ]Left [ ]Bilateral  [ ]Crackles        [ ]Right [ ]Left [ ]Bilateral  [ ]Wheezing     [ ]Right [ ]Left [ ]Bilateral  [ ]Diminished breath sounds [ ]right [ ]left [ ]bilateral  [  ] Pleurx    -CARDIOVASCULAR:    [ ] No JVD  [ ]Regular [ ]Irregular [ ]Tachy  [ ]Malick [ ]Murmur [  ] Edema  [ ]Other    -GASTROINTESTINAL:  [ ]Soft  [ ]Distended   [ ]+BS  [ ]Non tender [ ]Tender     [ ]PEG [ ]OGT/ NGT   [  ] Pleurx [ ]Other  Last BM:    -GENITOURINARY:  [ ]Normal [ ] Incontinent   [ ]Oliguria/Anuria   [ ]Bray    [  ] Percutaneous nephrostomy     -MUSCULOSKELETAL:   [ ]Normal   [ ]Weakness  [ ]Bed/Wheelchair bound [ ]Edema    -NEUROLOGIC:   [ ]No focal deficits  [ ]Cognitive impairment      [ ]Dysphagia [ ]Dysarthria [ ]Paresis [ ]Other     -SKIN:   [ ]Normal  [ ]Rash  [  ] Petechiae  [ ] Lesion  [  ] Nodule    [ ]Pressure ulcer(s)  Present on admission [ ]y [ ]n  [ ]Other  =================================================================  CRITICAL CARE:  [ ] Shock   -Type of shock   [ ]Septic [ ]Cardiogenic [ ]Neurologic [ ]Hypovolemic  [ ]  Vasopressors   -Intervention  [ ]  Inotropes     [ ]Respiratory failure present   [ ]Acute  [ ]Chronic [ ]Hypoxic  [ ]Hypercarbic [ ]Other  -Intervention  [ ]Mechanical ventilation [ ]NIMV  [ ]High flow        [ ]Other organ failure   ==================================================================  LABS:            RADIOLOGY & ADDITIONAL STUDIES:    PROTEIN CALORIE MALNUTRITION PRESENT:   [ ]mild [ ]moderate [ ]severe [ ] cachexia [ ]morbid obesity  https://www.andeal.org/vault/2440/web/files/ONC/Table_Clinical%20Characteristics%20to%20Document%20Malnutrition-White%20JV%20et%20al%202012.pdf    Height (cm): 162.6 (04-22-23 @ 10:11)  Weight (kg): 63.5 (04-22-23 @ 10:11)  BMI (kg/m2): 24 (04-22-23 @ 10:11)    [ ]PPSV2 < or = to 30%   [ ]significant weight loss    [ ]poor nutritional intake    [ ]anasarca  [ ]Artificial Nutrition      Other REFERRALS:    [ ]Hospice    [ ]Child Life    [ ]Social Work    [ ]Case management

## 2023-04-27 NOTE — DISCHARGE NOTE PROVIDER - CARE PROVIDER_API CALL
Isael Echeverria)  Cardiology  [address], [address]  [place] [address]  Phone: [phone]  Fax: [phone]  Follow Up Time: Routine    MARGIE CHIN  Internal Medicine  [address], [place] [address]  Phone: ([phone]  Fax: ()-  Follow Up Time: Routine    Campos Puga)  Neurosurgery  [address], [address]  [place] [address]  Phone: [phone]  Fax: [phone]  Follow Up Time: 2 weeks

## 2023-04-27 NOTE — DISCHARGE NOTE PROVIDER - PROVIDER TOKENS
PROVIDER:[TOKEN:[[id number]],FOLLOWUP:[Routine]],PROVIDER:[TOKEN:[[id number]],FOLLOWUP:[Routine]],PROVIDER:[TOKEN:[[id number]],FOLLOWUP:[2 weeks]]

## 2023-04-27 NOTE — DISCHARGE NOTE PROVIDER - NSFOLLOWUPCLINICS_GEN_ALL_ED_FT
[facility]  Geriatrics  [address]  [place] [address]  Phone: [phone]  Fax:   Follow Up Time: Routine

## 2023-04-27 NOTE — DISCHARGE NOTE PROVIDER - HOSPITAL COURSE
77 year old man with HTN, HLD, p/w leg weakness, weight loss associated with multiple falls, most recent this morning, fell onto the side.  Had recent  work-up for this, including MRI brain, MRI C/T/L-spine, significant for moderate neuroforaminal stenosis of the C-spine, no major issues to the L-spine.  Patient also had outpatient echo, stress test, all normal.  Patient presented to the ER with multiple falls. Imaging showed SDH. Per NeuroSx there was no intervention needed. Patient was also noted to have an echo showing severely decreased lv fxn. Patient will continue with Metoprolol and entresto. Speech and Swallow evaluated patient and reccomended N   77 year old man with HTN, HLD, p/w leg weakness, weight loss associated with multiple falls, most recent this morning, fell onto the side.  Had recent  work-up for this, including MRI brain, MRI C/T/L-spine, significant for moderate neuroforaminal stenosis of the C-spine, no major issues to the L-spine.  Patient also had outpatient echo, stress test, all normal.  Patient presented to the ER with multiple falls. Imaging showed SDH. Per NeuroSx there was no intervention needed. Patient was also noted to have an echo showing severely decreased lv fxn. Patient will continue with Metoprolol and entresto. Patient is now on a soft and bite size diet with mod thick liquids as per discussion with Neuro and family. Patient is medically  stable for discharge to  [facility] by Dr. Doan. Outpatient follow up with Dr. Puga, and PCP/palliative care. Med recc and clearance discussed with attending.

## 2023-04-27 NOTE — DISCHARGE NOTE PROVIDER - NSDCMRMEDTOKEN_GEN_ALL_CORE_FT
aspirin 81 mg oral delayed release tablet: 1 tab(s) orally once a day  Breo Ellipta 200 mcg-25 mcg/inh inhalation powder: 1 puff(s) inhaled once a day  dexlansoprazole 60 mg oral delayed release capsule: 1 cap(s) orally once a day  losartan 50 mg oral tablet: 1 tab(s) orally once a day  metFORMIN 850 mg oral tablet: 1 tab(s) orally 2 times a day  metoprolol succinate 25 mg oral tablet, extended release: 1 tab(s) orally once a day  simvastatin 40 mg oral tablet: 1 tab(s) orally once a day  tamsulosin 0.4 mg oral capsule: 1 cap(s) orally once a day  traZODone 100 mg oral tablet: 1 tab(s) orally once a day (at bedtime)  Vascepa 1 g oral capsule: 2 cap(s) orally 2 times a day   aspirin 81 mg oral delayed release tablet: 1 tab(s) orally once a day  levETIRAcetam 500 mg oral tablet: 1 tab(s) orally 2 times a day  metFORMIN 850 mg oral tablet: 1 tab(s) orally 2 times a day  metoprolol succinate 25 mg oral tablet, extended release: 1 tab(s) orally once a day  sacubitril-valsartan 49 mg-51 mg oral tablet: 1 tab(s) orally 2 times a day  simvastatin 40 mg oral tablet: 1 tab(s) orally once a day  tamsulosin 0.4 mg oral capsule: 1 cap(s) orally once a day  traZODone 100 mg oral tablet: 1 tab(s) orally once a day (at bedtime)

## 2023-04-27 NOTE — DISCHARGE NOTE PROVIDER - NSDCFUADDAPPT_GEN_ALL_CORE_FT
APPTS ARE READY TO BE MADE: [x ] YES    Best Family or Patient Contact (if needed):    Additional Information about above appointments (if needed):    1:   2:   3:     Other comments or requests:    APPTS ARE READY TO BE MADE: [x ] YES    Best Family or Patient Contact (if needed):    Additional Information about above appointments (if needed):    1:   2:   3:     Other comments or requests:   Patient is being discharged to rehab. Patient/caregiver will arrange follow up care appointments.

## 2023-04-27 NOTE — CONSULT NOTE ADULT - REASON FOR ADMISSION
generalized weakness , multiple falls
NA

## 2023-04-27 NOTE — CHART NOTE - NSCHARTNOTEFT_GEN_A_CORE
Informed that the patient will go to rehab today.  Will cancel consult in light of imminent discharge

## 2023-04-27 NOTE — PROGRESS NOTE ADULT - SUBJECTIVE AND OBJECTIVE BOX
CARDIOLOGY FOLLOW UP - Dr. Echeverria  DATE OF SERVICE: 4/25/23    CC  No CV complaints    REVIEW OF SYSTEMS:  CONSTITUTIONAL: No fever, weight loss, or fatigue  RESPIRATORY: No cough, wheezing, chills or hemoptysis; No Shortness of Breath  CARDIOVASCULAR: No chest pain, palpitations, passing out, dizziness, or leg swelling  GASTROINTESTINAL: No abdominal or epigastric pain. No nausea, vomiting, or hematemesis; No diarrhea or constipation. No melena or hematochezia.  VASCULAR: No edema     PHYSICAL EXAM:  T(C): 36.4 (04-25-23 @ 05:01), Max: 36.6 (04-24-23 @ 12:44)  HR: 100 (04-25-23 @ 08:31) (100 - 136)  BP: 130/94 (04-25-23 @ 08:31) (116/73 - 138/92)  RR: 18 (04-25-23 @ 05:01) (18 - 18)  SpO2: 98% (04-25-23 @ 05:01) (95% - 98%)  Wt(kg): --  I&O's Summary    24 Apr 2023 07:01  -  25 Apr 2023 07:00  --------------------------------------------------------  IN: 170 mL / OUT: 0 mL / NET: 170 mL        Appearance: Normal	  Cardiovascular: Normal S1 S2,RRR, No JVD, No murmurs  Respiratory: Lungs clear to auscultation b/l	  Gastrointestinal:  Soft, Non-tender, + BS	  Extremities: Normal range of motion, No clubbing, cyanosis or edema      Home Medications:  aspirin 81 mg oral delayed release tablet: 1 tab(s) orally once a day (22 Apr 2023 18:29)  Breo Ellipta 200 mcg-25 mcg/inh inhalation powder: 1 puff(s) inhaled once a day (22 Apr 2023 18:29)  dexlansoprazole 60 mg oral delayed release capsule: 1 cap(s) orally once a day (22 Apr 2023 18:29)  losartan 50 mg oral tablet: 1 tab(s) orally once a day (22 Apr 2023 18:29)  metFORMIN 850 mg oral tablet: 1 tab(s) orally 2 times a day (22 Apr 2023 18:29)  metoprolol succinate 25 mg oral tablet, extended release: 1 tab(s) orally once a day (22 Apr 2023 18:29)  simvastatin 40 mg oral tablet: 1 tab(s) orally once a day (22 Apr 2023 18:29)  tamsulosin 0.4 mg oral capsule: 1 cap(s) orally once a day (22 Apr 2023 18:29)  traZODone 100 mg oral tablet: 1 tab(s) orally once a day (at bedtime) (22 Apr 2023 18:29)  Vascepa 1 g oral capsule: 2 cap(s) orally 2 times a day (22 Apr 2023 18:29)      MEDICATIONS  (STANDING):  dextrose 5%. 1000 milliLiter(s) (100 mL/Hr) IV Continuous <Continuous>  dextrose 5%. 1000 milliLiter(s) (50 mL/Hr) IV Continuous <Continuous>  dextrose 50% Injectable 12.5 Gram(s) IV Push once  dextrose 50% Injectable 25 Gram(s) IV Push once  dextrose 50% Injectable 25 Gram(s) IV Push once  glucagon  Injectable 1 milliGRAM(s) IntraMuscular once  insulin lispro (ADMELOG) corrective regimen sliding scale   SubCutaneous three times a day before meals  insulin lispro Injectable (ADMELOG) 3 Unit(s) SubCutaneous before breakfast  insulin lispro Injectable (ADMELOG) 3 Unit(s) SubCutaneous before lunch  insulin lispro Injectable (ADMELOG) 3 Unit(s) SubCutaneous before dinner  levETIRAcetam 500 milliGRAM(s) Oral two times a day  metoprolol succinate ER 25 milliGRAM(s) Oral daily  pantoprazole    Tablet 40 milliGRAM(s) Oral before breakfast      TELEMETRY: 	    ECG:  	  RADIOLOGY:   DIAGNOSTIC TESTING:  [ ] Echocardiogram:  [ ]  Catheterization:  [ ] Stress Test:    OTHER: 	    LABS:	 	                            15.3   9.54  )-----------( 249      ( 24 Apr 2023 06:53 )             45.4     04-25    137  |  100  |  23  ----------------------------<  168<H>  4.6   |  16<L>  |  0.97    Ca    9.7      25 Apr 2023 06:50    TPro  7.8  /  Alb  4.3  /  TBili  0.8  /  DBili  x   /  AST  23  /  ALT  17  /  AlkPhos  67  04-24            
[facility]      Seen earlier today [Please note that date of entry above  is the actual  DATE OF SERVICE] No new neurological symptoms reported. Remains stable neurologically.   - Today, patient is without complaints.          *****MEDICATIONS: Current medication reviewed and documented.    MEDICATIONS  (STANDING):  dextrose 5%. 1000 milliLiter(s) (100 mL/Hr) IV Continuous <Continuous>  dextrose 5%. 1000 milliLiter(s) (50 mL/Hr) IV Continuous <Continuous>  dextrose 50% Injectable 25 Gram(s) IV Push once  dextrose 50% Injectable 25 Gram(s) IV Push once  dextrose 50% Injectable 12.5 Gram(s) IV Push once  glucagon  Injectable 1 milliGRAM(s) IntraMuscular once  insulin lispro (ADMELOG) corrective regimen sliding scale   SubCutaneous three times a day before meals  insulin lispro Injectable (ADMELOG) 3 Unit(s) SubCutaneous before breakfast  insulin lispro Injectable (ADMELOG) 3 Unit(s) SubCutaneous before lunch  insulin lispro Injectable (ADMELOG) 3 Unit(s) SubCutaneous before dinner  levETIRAcetam 500 milliGRAM(s) Oral two times a day  metoprolol succinate ER 25 milliGRAM(s) Oral daily  pantoprazole    Tablet 40 milliGRAM(s) Oral before breakfast    MEDICATIONS  (PRN):  dextrose Oral Gel 15 Gram(s) Oral once PRN Blood Glucose LESS THAN 70 milliGRAM(s)/deciliter          ***** VITAL SIGNS:   Vital Signs Last 24 Hrs       T(F): 97.3 (04-24-23 @ 20:39), Max: 97.8 (04-24-23 @ 12:44)  HR: 136 (04-24-23 @ 20:39) (101 - 136)  BP: 138/92 (04-24-23 @ 20:39) (134/84 - 144/89)  RR: 18 (04-24-23 @ 20:39) (18 - 18)  SpO2: 95% (04-24-23 @ 20:39) (95% - 99%)  Wt(kg): --  I&O's Summary    23 Apr 2023 07:01  -  24 Apr 2023 07:00  --------------------------------------------------------  IN: 250 mL / OUT: 0 mL / NET: 250 mL             *****PHYSICAL EXAM: pt is alert not cooperative with exam   not following commands     Gait not assessed.            *****LAB AND IMAGING:                        15.3   9.54  )-----------( 249      ( 24 Apr 2023 06:53 )             45.4               04-24    134<L>  |  94<L>  |  15  ----------------------------<  123<H>  3.4<L>   |  23  |  0.95    Ca    9.6      24 Apr 2023 06:53    TPro  7.8  /  Alb  4.3  /  TBili  0.8  /  DBili  x   /  AST  23  /  ALT  17  /  AlkPhos  67  04-24                     < from: CT Head No Cont (04.23.23 @ 12:58) >    Tiny LEFT posterior frontal parietal subdural hematoma   unchanged. Minimal periventricular white matter ischemia is noted. Mild   cerebellar atrophy is noted.    Mild to moderate mucosal thickening in   the BILATERAL maxillary sinuses    < end of copied text >      [All pertinent recent Imaging/Reports reviewed]            *****A S S E S S M E N T   A N D   P L A N :    78YO M hx HTN, HLD, p/w leg weakness, weight loss. Onset months prior, associated with multiple falls, most recent this morning, fell onto the side.  Also endorses fall with head strike 2 days prior, patient at baseline mental status.  Patient has had significant work-up for this, including MRI brain, MRI C/T/L-spine, significant for moderate neuroforaminal stenosis of the C-spine, no major issues to the L-spine.  Patient also had outpatient echo, stress test, all normal.  Per son, has had 10 pounds of weight loss over the past 2 weeks, appears very thin, decreased p.o. intake.  Denies any recent blurry vision, numbness/paresthesias to arms/legs, headache, neck pain/stiffness.  denies recent fevers, cough, shortness of breath, abdominal pain, back pain.  Patient not on blood thinner.        Problem/Recommendations 1:falls with traumatic sdh small   no interventions  likely multifactorial related to deconditioning, microvascular disease related gait instability, hx of cervical degenerative changese   and impulsivity due to underlying cognitive impairment   pt eval likely benefit from rehab short term   b12/folate/tsh        Problem/Recommendations 2:ams   lkely underlyjng cog impairment   at risk for sundownig   sleep wake cycle regulation   redirect  consider change to obs room      Thank you for allowing me to participate in the care of this patient. Will continue to follow patient periodically. Please do not hesitate to call me if you have any  questions or if there has been a change in patients neurological status     ________________  Yoselin Jones MD  [facility]  [phone]      33 minutes spent on total encounter; more than 50 % of the visit was  spent counseling about plan of care, compliance to diet/exercise and medication regimen and or  coordinating care by the attending physician.      It is advised that stroke patients follow up with PORTER Lee @ [phone] in 1- 2 weeks.   Others please follow up with Dr. Michael Nissenbaum [phone]
pt. started on dysphagia diet moist and bite size with moderate ( honey) thick liquid 
CARDIOLOGY FOLLOW UP - Dr. Echeverria  DATE OF SERVICE: 4/24/23    CC  No CV events    REVIEW OF SYSTEMS:  CONSTITUTIONAL: No fever, weight loss, or fatigue  RESPIRATORY: No cough, wheezing, chills or hemoptysis; No Shortness of Breath  CARDIOVASCULAR: No chest pain, palpitations, passing out, dizziness, or leg swelling  GASTROINTESTINAL: No abdominal or epigastric pain. No nausea, vomiting, or hematemesis; No diarrhea or constipation. No melena or hematochezia.  VASCULAR: No edema     PHYSICAL EXAM:  T(C): 36.4 (04-24-23 @ 04:57), Max: 36.7 (04-23-23 @ 15:39)  HR: 101 (04-24-23 @ 04:57) (98 - 102)  BP: 134/84 (04-24-23 @ 04:57) (123/82 - 138/84)  RR: 18 (04-24-23 @ 04:57) (18 - 18)  SpO2: 98% (04-24-23 @ 04:57) (98% - 99%)  Wt(kg): --  I&O's Summary    23 Apr 2023 07:01  -  24 Apr 2023 07:00  --------------------------------------------------------  IN: 250 mL / OUT: 0 mL / NET: 250 mL        Appearance: Normal	  Cardiovascular: Normal S1 S2,RRR, No JVD, No murmurs  Respiratory: Lungs clear to auscultation b/l	  Gastrointestinal:  Soft, Non-tender, + BS	  Extremities: Normal range of motion, No clubbing, cyanosis or edema      Home Medications:  aspirin 81 mg oral delayed release tablet: 1 tab(s) orally once a day (22 Apr 2023 18:29)  Breo Ellipta 200 mcg-25 mcg/inh inhalation powder: 1 puff(s) inhaled once a day (22 Apr 2023 18:29)  dexlansoprazole 60 mg oral delayed release capsule: 1 cap(s) orally once a day (22 Apr 2023 18:29)  losartan 50 mg oral tablet: 1 tab(s) orally once a day (22 Apr 2023 18:29)  metFORMIN 850 mg oral tablet: 1 tab(s) orally 2 times a day (22 Apr 2023 18:29)  metoprolol succinate 25 mg oral tablet, extended release: 1 tab(s) orally once a day (22 Apr 2023 18:29)  simvastatin 40 mg oral tablet: 1 tab(s) orally once a day (22 Apr 2023 18:29)  tamsulosin 0.4 mg oral capsule: 1 cap(s) orally once a day (22 Apr 2023 18:29)  traZODone 100 mg oral tablet: 1 tab(s) orally once a day (at bedtime) (22 Apr 2023 18:29)  Vascepa 1 g oral capsule: 2 cap(s) orally 2 times a day (22 Apr 2023 18:29)      MEDICATIONS  (STANDING):  dextrose 5%. 1000 milliLiter(s) (100 mL/Hr) IV Continuous <Continuous>  dextrose 5%. 1000 milliLiter(s) (50 mL/Hr) IV Continuous <Continuous>  dextrose 50% Injectable 12.5 Gram(s) IV Push once  dextrose 50% Injectable 25 Gram(s) IV Push once  dextrose 50% Injectable 25 Gram(s) IV Push once  glucagon  Injectable 1 milliGRAM(s) IntraMuscular once  insulin lispro (ADMELOG) corrective regimen sliding scale   SubCutaneous three times a day before meals  insulin lispro Injectable (ADMELOG) 3 Unit(s) SubCutaneous before breakfast  insulin lispro Injectable (ADMELOG) 3 Unit(s) SubCutaneous before lunch  insulin lispro Injectable (ADMELOG) 3 Unit(s) SubCutaneous before dinner  levETIRAcetam 500 milliGRAM(s) Oral two times a day  pantoprazole    Tablet 40 milliGRAM(s) Oral before breakfast  potassium chloride    Tablet ER 40 milliEquivalent(s) Oral every 4 hours      TELEMETRY: 	    ECG:  	  RADIOLOGY:   DIAGNOSTIC TESTING:  [ ] Echocardiogram:  [ ]  Catheterization:  [ ] Stress Test:    OTHER: 	    LABS:	 	                            15.3   9.54  )-----------( 249      ( 24 Apr 2023 06:53 )             45.4     04-24    134<L>  |  94<L>  |  15  ----------------------------<  123<H>  3.4<L>   |  23  |  0.95    Ca    9.6      24 Apr 2023 06:53  Phos  3.2     04-22  Mg     2.0     04-22    TPro  7.8  /  Alb  4.3  /  TBili  0.8  /  DBili  x   /  AST  23  /  ALT  17  /  AlkPhos  67  04-24    PT/INR - ( 22 Apr 2023 16:32 )   PT: 12.0 sec;   INR: 1.04 ratio         PTT - ( 22 Apr 2023 16:32 )  PTT:28.5 sec        
CARDIOLOGY FOLLOW UP - Dr. Echeverria  DATE OF SERVICE: 4/26/23    CC  No CV events    REVIEW OF SYSTEMS:  CONSTITUTIONAL: No fever, weight loss, or fatigue  RESPIRATORY: No cough, wheezing, chills or hemoptysis; No Shortness of Breath  CARDIOVASCULAR: No chest pain, palpitations, passing out, dizziness, or leg swelling  GASTROINTESTINAL: No abdominal or epigastric pain. No nausea, vomiting, or hematemesis; No diarrhea or constipation. No melena or hematochezia.  VASCULAR: No edema     PHYSICAL EXAM:  T(C): 36.3 (04-26-23 @ 04:18), Max: 36.6 (04-25-23 @ 15:56)  HR: 84 (04-26-23 @ 04:18) (84 - 106)  BP: 119/79 (04-26-23 @ 04:18) (119/79 - 132/79)  RR: 18 (04-26-23 @ 04:18) (18 - 18)  SpO2: 97% (04-26-23 @ 04:18) (95% - 100%)  Wt(kg): --  I&O's Summary    25 Apr 2023 07:01  -  26 Apr 2023 07:00  --------------------------------------------------------  IN: 1380 mL / OUT: 0 mL / NET: 1380 mL        Appearance: Normal	  Cardiovascular: Normal S1 S2,RRR, No JVD, No murmurs  Respiratory: Lungs clear to auscultation	  Gastrointestinal:  Soft, Non-tender, + BS	  Extremities: Normal range of motion, No clubbing, cyanosis or edema      Home Medications:  aspirin 81 mg oral delayed release tablet: 1 tab(s) orally once a day (22 Apr 2023 18:29)  Breo Ellipta 200 mcg-25 mcg/inh inhalation powder: 1 puff(s) inhaled once a day (22 Apr 2023 18:29)  dexlansoprazole 60 mg oral delayed release capsule: 1 cap(s) orally once a day (22 Apr 2023 18:29)  losartan 50 mg oral tablet: 1 tab(s) orally once a day (22 Apr 2023 18:29)  metFORMIN 850 mg oral tablet: 1 tab(s) orally 2 times a day (22 Apr 2023 18:29)  metoprolol succinate 25 mg oral tablet, extended release: 1 tab(s) orally once a day (22 Apr 2023 18:29)  simvastatin 40 mg oral tablet: 1 tab(s) orally once a day (22 Apr 2023 18:29)  tamsulosin 0.4 mg oral capsule: 1 cap(s) orally once a day (22 Apr 2023 18:29)  traZODone 100 mg oral tablet: 1 tab(s) orally once a day (at bedtime) (22 Apr 2023 18:29)  Vascepa 1 g oral capsule: 2 cap(s) orally 2 times a day (22 Apr 2023 18:29)      MEDICATIONS  (STANDING):  dextrose 5% + sodium chloride 0.45%. 1000 milliLiter(s) (60 mL/Hr) IV Continuous <Continuous>  dextrose 5%. 1000 milliLiter(s) (100 mL/Hr) IV Continuous <Continuous>  dextrose 5%. 1000 milliLiter(s) (50 mL/Hr) IV Continuous <Continuous>  dextrose 50% Injectable 25 Gram(s) IV Push once  dextrose 50% Injectable 25 Gram(s) IV Push once  dextrose 50% Injectable 12.5 Gram(s) IV Push once  glucagon  Injectable 1 milliGRAM(s) IntraMuscular once  insulin lispro (ADMELOG) corrective regimen sliding scale   SubCutaneous every 6 hours  insulin lispro Injectable (ADMELOG) 3 Unit(s) SubCutaneous before breakfast  insulin lispro Injectable (ADMELOG) 3 Unit(s) SubCutaneous before lunch  insulin lispro Injectable (ADMELOG) 3 Unit(s) SubCutaneous before dinner  levETIRAcetam 500 milliGRAM(s) Oral two times a day  metoprolol succinate ER 25 milliGRAM(s) Oral daily  pantoprazole    Tablet 40 milliGRAM(s) Oral before breakfast      TELEMETRY: 	    ECG:  	  RADIOLOGY:   DIAGNOSTIC TESTING:  [x] Echocardiogram:  < from: TTE W or WO Ultrasound Enhancing Agent (04.25.23 @ 10:28) >  CONCLUSIONS:      1. Technically difficult image quality.   2. The left ventricular systolic function is severely decreased with an ejection fraction of 26 % by Fregoso's method of disks. There is global left ventricular hypokinesis. No evidence of a thrombus in the left ventricle.   3. Normal right ventricular cavity size, normal wall thickness and borderline reduced systolic function.   4. Trace mitral regurgitation.   5. The aortic root at sinuses of Valsalva is mildly dilated.   6. Trace aortic regurgitation.   7. No prior echocardiogram is available for comparison.    < end of copied text >    [ ]  Catheterization:  [ ] Stress Test:    OTHER: 	    LABS:	 	        04-25    137  |  100  |  23  ----------------------------<  168<H>  4.6   |  16<L>  |  0.97    Ca    9.7      25 Apr 2023 06:50              
Patient is a 77y old  Male who presents with a chief complaint of generalized weakness , multiple falls (23 Apr 2023 20:28)      INTERVAL HPI/OVERNIGHT EVENTS: seen and examined, NAD   T(C): 36.5 (04-23-23 @ 19:45), Max: 36.7 (04-23-23 @ 15:39)  HR: 98 (04-23-23 @ 19:45) (95 - 102)  BP: 138/84 (04-23-23 @ 19:45) (109/74 - 138/84)  RR: 18 (04-23-23 @ 19:45) (18 - 18)  SpO2: 99% (04-23-23 @ 19:45) (97% - 99%)  Wt(kg): --  I&O's Summary    22 Apr 2023 07:01  -  23 Apr 2023 07:00  --------------------------------------------------------  IN: 50 mL / OUT: 0 mL / NET: 50 mL        PAST MEDICAL & SURGICAL HISTORY:  No pertinent past medical history      No significant past surgical history          SOCIAL HISTORY  Alcohol:  Tobacco:  Illicit substance use:    FAMILY HISTORY:    REVIEW OF SYSTEMS:  CONSTITUTIONAL: No fever, weight loss, or fatigue  EYES: No eye pain, visual disturbances, or discharge  ENMT:  No difficulty hearing, tinnitus, vertigo; No sinus or throat pain  NECK: No pain or stiffness  RESPIRATORY: No cough, wheezing, chills or hemoptysis; No shortness of breath  CARDIOVASCULAR: No chest pain, palpitations, dizziness, or leg swelling  GASTROINTESTINAL: No abdominal or epigastric pain. No nausea, vomiting, or hematemesis; No diarrhea or constipation. No melena or hematochezia.  GENITOURINARY: No dysuria, frequency, hematuria, or incontinence  NEUROLOGICAL: No headaches, memory loss, loss of strength, numbness, or tremors  SKIN: No itching, burning, rashes, or lesions   LYMPH NODES: No enlarged glands  ENDOCRINE: No heat or cold intolerance; No hair loss  MUSCULOSKELETAL: No joint pain or swelling; No muscle, back, or extremity pain  PSYCHIATRIC: No depression, anxiety, mood swings, or difficulty sleeping  HEME/LYMPH: No easy bruising, or bleeding gums  ALLERY AND IMMUNOLOGIC: No hives or eczema    RADIOLOGY & ADDITIONAL TESTS:    Imaging Personally Reviewed:  [ ] YES  [ ] NO    Consultant(s) Notes Reviewed:  [ ] YES  [ ] NO    PHYSICAL EXAM:  GENERAL: NAD, well-groomed, well-developed  HEAD:  Atraumatic, Normocephalic  EYES: EOMI, PERRLA, conjunctiva and sclera clear  ENMT: No tonsillar erythema, exudates, or enlargement; Moist mucous membranes, Good dentition, No lesions  NECK: Supple, No JVD, Normal thyroid  NERVOUS SYSTEM:  Alert & Oriented X3, Good concentration; Motor Strength 5/5 B/L upper and lower extremities; DTRs 2+ intact and symmetric  CHEST/LUNG: Clear to percussion bilaterally; No rales, rhonchi, wheezing, or rubs  HEART: Regular rate and rhythm; No murmurs, rubs, or gallops  ABDOMEN: Soft, Nontender, Nondistended; Bowel sounds present  EXTREMITIES:  2+ Peripheral Pulses, No clubbing, cyanosis, or edema  LYMPH: No lymphadenopathy noted  SKIN: No rashes or lesions    LABS:                        14.1   5.84  )-----------( 218      ( 22 Apr 2023 11:22 )             42.3     04-22    136  |  98  |  17  ----------------------------<  161<H>  4.5   |  26  |  0.93    Ca    9.7      22 Apr 2023 11:22  Phos  3.2     04-22  Mg     2.0     04-22    TPro  7.3  /  Alb  4.0  /  TBili  0.7  /  DBili  x   /  AST  36  /  ALT  19  /  AlkPhos  56  04-22    PT/INR - ( 22 Apr 2023 16:32 )   PT: 12.0 sec;   INR: 1.04 ratio         PTT - ( 22 Apr 2023 16:32 )  PTT:28.5 sec    CAPILLARY BLOOD GLUCOSE      POCT Blood Glucose.: 108 mg/dL (23 Apr 2023 21:41)  POCT Blood Glucose.: 116 mg/dL (23 Apr 2023 16:51)  POCT Blood Glucose.: 186 mg/dL (23 Apr 2023 12:21)  POCT Blood Glucose.: 97 mg/dL (23 Apr 2023 08:51)  POCT Blood Glucose.: 95 mg/dL (23 Apr 2023 06:21)            MEDICATIONS  (STANDING):  dextrose 5%. 1000 milliLiter(s) (100 mL/Hr) IV Continuous <Continuous>  dextrose 5%. 1000 milliLiter(s) (50 mL/Hr) IV Continuous <Continuous>  dextrose 50% Injectable 12.5 Gram(s) IV Push once  dextrose 50% Injectable 25 Gram(s) IV Push once  dextrose 50% Injectable 25 Gram(s) IV Push once  glucagon  Injectable 1 milliGRAM(s) IntraMuscular once  insulin lispro (ADMELOG) corrective regimen sliding scale   SubCutaneous three times a day before meals  insulin lispro Injectable (ADMELOG) 3 Unit(s) SubCutaneous before breakfast  insulin lispro Injectable (ADMELOG) 3 Unit(s) SubCutaneous before dinner  insulin lispro Injectable (ADMELOG) 3 Unit(s) SubCutaneous before lunch  levETIRAcetam 500 milliGRAM(s) Oral two times a day  pantoprazole    Tablet 40 milliGRAM(s) Oral before breakfast    MEDICATIONS  (PRN):  dextrose Oral Gel 15 Gram(s) Oral once PRN Blood Glucose LESS THAN 70 milliGRAM(s)/deciliter      Care Discussed with Consultants/Other Providers [ ] YES  [ ] NO
CARDIOLOGY FOLLOW UP - Dr. Echeverria  DATE OF SERVICE: 4/27/23    CC  No CV complaints    REVIEW OF SYSTEMS:  CONSTITUTIONAL: No fever, weight loss, or fatigue  RESPIRATORY: No cough, wheezing, chills or hemoptysis; No Shortness of Breath  CARDIOVASCULAR: No chest pain, palpitations, passing out, dizziness, or leg swelling  GASTROINTESTINAL: No abdominal or epigastric pain. No nausea, vomiting, or hematemesis; No diarrhea or constipation. No melena or hematochezia.  VASCULAR: No edema     PHYSICAL EXAM:  T(C): 36.5 (04-27-23 @ 04:53), Max: 36.5 (04-26-23 @ 12:55)  HR: 97 (04-27-23 @ 04:53) (97 - 99)  BP: 126/79 (04-27-23 @ 04:53) (121/81 - 127/84)  RR: 18 (04-27-23 @ 04:53) (18 - 18)  SpO2: 98% (04-27-23 @ 04:53) (96% - 99%)  Wt(kg): --  I&O's Summary    26 Apr 2023 07:01  -  27 Apr 2023 07:00  --------------------------------------------------------  IN: 720 mL / OUT: 100 mL / NET: 620 mL    27 Apr 2023 07:01  -  27 Apr 2023 10:31  --------------------------------------------------------  IN: 0 mL / OUT: 0 mL / NET: 0 mL        Appearance: Normal	  Cardiovascular: Normal S1 S2,RRR, No JVD, No murmurs  Respiratory: Lungs clear to auscultation b/l  Gastrointestinal:  Soft, Non-tender, + BS	  Extremities: Normal range of motion, No clubbing, cyanosis or edema      Home Medications:  aspirin 81 mg oral delayed release tablet: 1 tab(s) orally once a day (22 Apr 2023 18:29)  Breo Ellipta 200 mcg-25 mcg/inh inhalation powder: 1 puff(s) inhaled once a day (22 Apr 2023 18:29)  dexlansoprazole 60 mg oral delayed release capsule: 1 cap(s) orally once a day (22 Apr 2023 18:29)  losartan 50 mg oral tablet: 1 tab(s) orally once a day (22 Apr 2023 18:29)  metFORMIN 850 mg oral tablet: 1 tab(s) orally 2 times a day (22 Apr 2023 18:29)  metoprolol succinate 25 mg oral tablet, extended release: 1 tab(s) orally once a day (22 Apr 2023 18:29)  simvastatin 40 mg oral tablet: 1 tab(s) orally once a day (22 Apr 2023 18:29)  tamsulosin 0.4 mg oral capsule: 1 cap(s) orally once a day (22 Apr 2023 18:29)  traZODone 100 mg oral tablet: 1 tab(s) orally once a day (at bedtime) (22 Apr 2023 18:29)  Vascepa 1 g oral capsule: 2 cap(s) orally 2 times a day (22 Apr 2023 18:29)      MEDICATIONS  (STANDING):  dextrose 5% + sodium chloride 0.45%. 1000 milliLiter(s) (60 mL/Hr) IV Continuous <Continuous>  dextrose 5%. 1000 milliLiter(s) (50 mL/Hr) IV Continuous <Continuous>  dextrose 5%. 1000 milliLiter(s) (100 mL/Hr) IV Continuous <Continuous>  dextrose 50% Injectable 12.5 Gram(s) IV Push once  dextrose 50% Injectable 25 Gram(s) IV Push once  dextrose 50% Injectable 25 Gram(s) IV Push once  glucagon  Injectable 1 milliGRAM(s) IntraMuscular once  insulin lispro (ADMELOG) corrective regimen sliding scale   SubCutaneous every 6 hours  insulin lispro Injectable (ADMELOG) 3 Unit(s) SubCutaneous before breakfast  insulin lispro Injectable (ADMELOG) 3 Unit(s) SubCutaneous before dinner  insulin lispro Injectable (ADMELOG) 3 Unit(s) SubCutaneous before lunch  levETIRAcetam 500 milliGRAM(s) Oral two times a day  metoprolol succinate ER 25 milliGRAM(s) Oral daily  pantoprazole    Tablet 40 milliGRAM(s) Oral before breakfast  sacubitril 49 mG/valsartan 51 mG 1 Tablet(s) Oral two times a day  thiamine IVPB 500 milliGRAM(s) IV Intermittent three times a day      TELEMETRY: 	    ECG:  	  RADIOLOGY:   DIAGNOSTIC TESTING:  [ ] Echocardiogram:  [ ]  Catheterization:  [ ] Stress Test:    OTHER: 	    LABS:	 	                      
Patient is a 77y old  Male who presents with a chief complaint of generalized weakness , multiple falls (24 Apr 2023 09:43)      INTERVAL HPI/OVERNIGHT EVENTS: seen and examined   T(C): 36.3 (04-24-23 @ 20:39), Max: 36.6 (04-24-23 @ 12:44)  HR: 106 (04-24-23 @ 21:00) (102 - 136)  BP: 138/92 (04-24-23 @ 20:39) (137/85 - 144/89)  RR: 18 (04-24-23 @ 20:39) (18 - 18)  SpO2: 95% (04-24-23 @ 20:39) (95% - 99%)  Wt(kg): --  I&O's Summary    23 Apr 2023 07:01  -  24 Apr 2023 07:00  --------------------------------------------------------  IN: 250 mL / OUT: 0 mL / NET: 250 mL    24 Apr 2023 07:01  -  25 Apr 2023 04:59  --------------------------------------------------------  IN: 120 mL / OUT: 0 mL / NET: 120 mL        PAST MEDICAL & SURGICAL HISTORY:  No pertinent past medical history      No significant past surgical history          SOCIAL HISTORY  Alcohol:  Tobacco:  Illicit substance use:    FAMILY HISTORY:    REVIEW OF SYSTEMS:  CONSTITUTIONAL: No fever, weight loss, or fatigue  EYES: No eye pain, visual disturbances, or discharge  ENMT:  No difficulty hearing, tinnitus, vertigo; No sinus or throat pain  NECK: No pain or stiffness  RESPIRATORY: No cough, wheezing, chills or hemoptysis; No shortness of breath  CARDIOVASCULAR: No chest pain, palpitations, dizziness, or leg swelling  GASTROINTESTINAL: No abdominal or epigastric pain. No nausea, vomiting, or hematemesis; No diarrhea or constipation. No melena or hematochezia.  GENITOURINARY: No dysuria, frequency, hematuria, or incontinence  NEUROLOGICAL: No headaches, memory loss, loss of strength, numbness, or tremors  SKIN: No itching, burning, rashes, or lesions   LYMPH NODES: No enlarged glands  ENDOCRINE: No heat or cold intolerance; No hair loss  MUSCULOSKELETAL: No joint pain or swelling; No muscle, back, or extremity pain  PSYCHIATRIC: No depression, anxiety, mood swings, or difficulty sleeping  HEME/LYMPH: No easy bruising, or bleeding gums  ALLERY AND IMMUNOLOGIC: No hives or eczema    RADIOLOGY & ADDITIONAL TESTS:    Imaging Personally Reviewed:  [ ] YES  [ ] NO    Consultant(s) Notes Reviewed:  [ ] YES  [ ] NO    PHYSICAL EXAM:  GENERAL: NAD, well-groomed, well-developed  HEAD:  Atraumatic, Normocephalic  EYES: EOMI, PERRLA, conjunctiva and sclera clear  ENMT: No tonsillar erythema, exudates, or enlargement; Moist mucous membranes, Good dentition, No lesions  NECK: Supple, No JVD, Normal thyroid  NERVOUS SYSTEM:  Alert & Oriented X3, Good concentration; Motor Strength 5/5 B/L upper and lower extremities; DTRs 2+ intact and symmetric  CHEST/LUNG: Clear to percussion bilaterally; No rales, rhonchi, wheezing, or rubs  HEART: Regular rate and rhythm; No murmurs, rubs, or gallops  ABDOMEN: Soft, Nontender, Nondistended; Bowel sounds present  EXTREMITIES:  2+ Peripheral Pulses, No clubbing, cyanosis, or edema  LYMPH: No lymphadenopathy noted  SKIN: No rashes or lesions    LABS:                        15.3   9.54  )-----------( 249      ( 24 Apr 2023 06:53 )             45.4     04-24    134<L>  |  94<L>  |  15  ----------------------------<  123<H>  3.4<L>   |  23  |  0.95    Ca    9.6      24 Apr 2023 06:53    TPro  7.8  /  Alb  4.3  /  TBili  0.8  /  DBili  x   /  AST  23  /  ALT  17  /  AlkPhos  67  04-24        CAPILLARY BLOOD GLUCOSE      POCT Blood Glucose.: 107 mg/dL (24 Apr 2023 22:10)  POCT Blood Glucose.: 229 mg/dL (24 Apr 2023 17:01)  POCT Blood Glucose.: 70 mg/dL (24 Apr 2023 12:52)  POCT Blood Glucose.: 120 mg/dL (24 Apr 2023 08:17)            MEDICATIONS  (STANDING):  dextrose 5%. 1000 milliLiter(s) (100 mL/Hr) IV Continuous <Continuous>  dextrose 5%. 1000 milliLiter(s) (50 mL/Hr) IV Continuous <Continuous>  dextrose 50% Injectable 25 Gram(s) IV Push once  dextrose 50% Injectable 25 Gram(s) IV Push once  dextrose 50% Injectable 12.5 Gram(s) IV Push once  glucagon  Injectable 1 milliGRAM(s) IntraMuscular once  insulin lispro (ADMELOG) corrective regimen sliding scale   SubCutaneous three times a day before meals  insulin lispro Injectable (ADMELOG) 3 Unit(s) SubCutaneous before breakfast  insulin lispro Injectable (ADMELOG) 3 Unit(s) SubCutaneous before lunch  insulin lispro Injectable (ADMELOG) 3 Unit(s) SubCutaneous before dinner  levETIRAcetam 500 milliGRAM(s) Oral two times a day  metoprolol succinate ER 25 milliGRAM(s) Oral daily  pantoprazole    Tablet 40 milliGRAM(s) Oral before breakfast    MEDICATIONS  (PRN):  dextrose Oral Gel 15 Gram(s) Oral once PRN Blood Glucose LESS THAN 70 milliGRAM(s)/deciliter      Care Discussed with Consultants/Other Providers [ ] YES  [ ] NO
Patient is a 77y old  Male who presents with a chief complaint of generalized weakness , multiple falls (26 Apr 2023 10:08)      INTERVAL HPI/OVERNIGHT EVENTS: baseline dementia , mumbles words , failed S&S eval , NPO  T(C): 36.4 (04-26-23 @ 19:54), Max: 36.5 (04-26-23 @ 12:55)  HR: 98 (04-26-23 @ 19:54) (84 - 99)  BP: 121/81 (04-26-23 @ 19:54) (119/79 - 127/84)  RR: 18 (04-26-23 @ 19:54) (18 - 18)  SpO2: 96% (04-26-23 @ 19:54) (96% - 99%)  Wt(kg): --  I&O's Summary    25 Apr 2023 07:01  -  26 Apr 2023 07:00  --------------------------------------------------------  IN: 1380 mL / OUT: 0 mL / NET: 1380 mL    26 Apr 2023 07:01  -  27 Apr 2023 03:24  --------------------------------------------------------  IN: 720 mL / OUT: 100 mL / NET: 620 mL        PAST MEDICAL & SURGICAL HISTORY:  No pertinent past medical history      No significant past surgical history          SOCIAL HISTORY  Alcohol:  Tobacco:  Illicit substance use:    FAMILY HISTORY:    REVIEW OF SYSTEMS:  CONSTITUTIONAL: No fever, weight loss, or fatigue  EYES: No eye pain, visual disturbances, or discharge  ENMT:  No difficulty hearing, tinnitus, vertigo; No sinus or throat pain  NECK: No pain or stiffness  RESPIRATORY: No cough, wheezing, chills or hemoptysis; No shortness of breath  CARDIOVASCULAR: No chest pain, palpitations, dizziness, or leg swelling  GASTROINTESTINAL: No abdominal or epigastric pain. No nausea, vomiting, or hematemesis; No diarrhea or constipation. No melena or hematochezia.  GENITOURINARY: No dysuria, frequency, hematuria, or incontinence  NEUROLOGICAL: No headaches, memory loss, loss of strength, numbness, or tremors  SKIN: No itching, burning, rashes, or lesions   LYMPH NODES: No enlarged glands  ENDOCRINE: No heat or cold intolerance; No hair loss  MUSCULOSKELETAL: No joint pain or swelling; No muscle, back, or extremity pain  PSYCHIATRIC: No depression, anxiety, mood swings, or difficulty sleeping  HEME/LYMPH: No easy bruising, or bleeding gums  ALLERY AND IMMUNOLOGIC: No hives or eczema    RADIOLOGY & ADDITIONAL TESTS:    Imaging Personally Reviewed:  [ ] YES  [ ] NO    Consultant(s) Notes Reviewed:  [ ] YES  [ ] NO    PHYSICAL EXAM:  GENERAL: NAD, well-groomed, well-developed  HEAD:  Atraumatic, Normocephalic  EYES: EOMI, PERRLA, conjunctiva and sclera clear  ENMT: No tonsillar erythema, exudates, or enlargement; Moist mucous membranes, Good dentition, No lesions  NECK: Supple, No JVD, Normal thyroid  NERVOUS SYSTEM:  Alert & Oriented X3, Good concentration; Motor Strength 5/5 B/L upper and lower extremities; DTRs 2+ intact and symmetric  CHEST/LUNG: Clear to percussion bilaterally; No rales, rhonchi, wheezing, or rubs  HEART: Regular rate and rhythm; No murmurs, rubs, or gallops  ABDOMEN: Soft, Nontender, Nondistended; Bowel sounds present  EXTREMITIES:  2+ Peripheral Pulses, No clubbing, cyanosis, or edema  LYMPH: No lymphadenopathy noted  SKIN: No rashes or lesions    LABS:    04-25    137  |  100  |  23  ----------------------------<  168<H>  4.6   |  16<L>  |  0.97    Ca    9.7      25 Apr 2023 06:50          CAPILLARY BLOOD GLUCOSE      POCT Blood Glucose.: 223 mg/dL (27 Apr 2023 00:06)  POCT Blood Glucose.: 184 mg/dL (26 Apr 2023 21:46)  POCT Blood Glucose.: 183 mg/dL (26 Apr 2023 16:58)  POCT Blood Glucose.: 189 mg/dL (26 Apr 2023 12:34)  POCT Blood Glucose.: 289 mg/dL (26 Apr 2023 08:43)  POCT Blood Glucose.: 191 mg/dL (26 Apr 2023 06:04)            MEDICATIONS  (STANDING):  dextrose 5% + sodium chloride 0.45%. 1000 milliLiter(s) (60 mL/Hr) IV Continuous <Continuous>  dextrose 5%. 1000 milliLiter(s) (50 mL/Hr) IV Continuous <Continuous>  dextrose 5%. 1000 milliLiter(s) (100 mL/Hr) IV Continuous <Continuous>  dextrose 50% Injectable 12.5 Gram(s) IV Push once  dextrose 50% Injectable 25 Gram(s) IV Push once  dextrose 50% Injectable 25 Gram(s) IV Push once  glucagon  Injectable 1 milliGRAM(s) IntraMuscular once  insulin lispro (ADMELOG) corrective regimen sliding scale   SubCutaneous every 6 hours  insulin lispro Injectable (ADMELOG) 3 Unit(s) SubCutaneous before breakfast  insulin lispro Injectable (ADMELOG) 3 Unit(s) SubCutaneous before dinner  insulin lispro Injectable (ADMELOG) 3 Unit(s) SubCutaneous before lunch  levETIRAcetam 500 milliGRAM(s) Oral two times a day  metoprolol succinate ER 25 milliGRAM(s) Oral daily  pantoprazole    Tablet 40 milliGRAM(s) Oral before breakfast  sacubitril 49 mG/valsartan 51 mG 1 Tablet(s) Oral two times a day    MEDICATIONS  (PRN):  dextrose Oral Gel 15 Gram(s) Oral once PRN Blood Glucose LESS THAN 70 milliGRAM(s)/deciliter      Care Discussed with Consultants/Other Providers [ ] YES  [ ] NO

## 2023-04-27 NOTE — PROGRESS NOTE ADULT - PROVIDER SPECIALTY LIST ADULT
Cardiology
Cardiology
Internal Medicine
Neurology
Cardiology
Cardiology
Internal Medicine

## 2023-04-27 NOTE — PROGRESS NOTE ADULT - REASON FOR ADMISSION
generalized weakness , multiple falls

## 2023-04-27 NOTE — DISCHARGE NOTE NURSING/CASE MANAGEMENT/SOCIAL WORK - PATIENT PORTAL LINK FT
You can access the FollowMyHealth Patient Portal offered by [facility] by registering at the following website: [email or web address]. By joining [employer]’s FollowMyHealth portal, you will also be able to view your health information using other applications (apps) compatible with our system.

## 2023-04-27 NOTE — PROGRESS NOTE ADULT - ASSESSMENT
Echo 4/26/23: Severely decreased LV fxn EF 26%, global LV hypokinesis    A/P    77 year old man with HTN, HLD, p/w leg weakness, weight loss associated with multiple falls, most recent this morning, fell onto the side.  Had recent  work-up for this, including MRI brain, MRI C/T/L-spine, significant for moderate neuroforaminal stenosis of the C-spine, no major issues to the L-spine.  Patient also had outpatient echo, stress test, all normal.      #s/p multiple falls, SDH  -SDH on CT scan   -Per neurosx no surgical intervention  -neuro w/u  -Echo with severely decreased lv fxn  -Would defer ischemic eval given pt clinical status, he is not candidate for dapt    #HFrEF  -As noted on echo  -Continue metoprolol  -Continue entresto    #Weight loss  -w/u per med  -ct c/a/p noted    #HTN  -Continue metoprolol 25mg qd  -Continue entresto    #Sinus Tachycardia  -Unclear etiology, seems improved from yesterday  -Continue metoprolol, if ST persists can increase to 50  -Echo as above      Please call/text me with questions/concerns between 8am-4pm  [phone]

## 2023-04-27 NOTE — CONSULT NOTE ADULT - CONSULT REASON
consulted for assistance with complex medical decision making in the context of a patient with
fall, SDH
SDH
ams

## 2023-04-27 NOTE — DISCHARGE NOTE PROVIDER - CARE PROVIDERS DIRECT ADDRESSES
,DirectAddress_Unknown,DirectAddress_Unknown,[email or web address]

## 2023-04-27 NOTE — DISCHARGE NOTE PROVIDER - NSDCCPCAREPLAN_GEN_ALL_CORE_FT
PRINCIPAL DISCHARGE DIAGNOSIS  Diagnosis: Subdural hemorrhage  Assessment and Plan of Treatment: Found to have a bleed on CT scan, with no neurosurgery intervention needed. Now stable from Neurosurgery perspective.     PRINCIPAL DISCHARGE DIAGNOSIS  Diagnosis: Subdural hemorrhage  Assessment and Plan of Treatment: Found to have a bleed on CT scan, with no neurosurgery intervention needed. Now stable from Neurosurgery perspective. Follow up in clinic with Dr. Puga in 2 weeks to follow up on the subdural hemorrhage and discuss cervical stenosis      SECONDARY DISCHARGE DIAGNOSES  Diagnosis: Seizure  Assessment and Plan of Treatment: Continue taking keppra until 4/29    Diagnosis: HFrEF (heart failure with reduced ejection fraction)  Assessment and Plan of Treatment: Continue taking entresto for your heart failure and follow up with cardiologist

## 2023-04-28 PROBLEM — Z00.00 ENCOUNTER FOR PREVENTIVE HEALTH EXAMINATION: Status: ACTIVE | Noted: 2023-04-28
